# Patient Record
Sex: FEMALE | Race: WHITE | HISPANIC OR LATINO | Employment: UNEMPLOYED | URBAN - METROPOLITAN AREA
[De-identification: names, ages, dates, MRNs, and addresses within clinical notes are randomized per-mention and may not be internally consistent; named-entity substitution may affect disease eponyms.]

---

## 2018-01-31 ENCOUNTER — TELEPHONE (OUTPATIENT)
Dept: MEDICAL GROUP | Age: 48
End: 2018-01-31

## 2018-01-31 RX ORDER — LISINOPRIL 20 MG/1
20 TABLET ORAL DAILY
COMMUNITY
End: 2018-02-22 | Stop reason: SDUPTHER

## 2018-01-31 NOTE — TELEPHONE ENCOUNTER
NEW PATIENT VISIT PRE-VISIT PLANNING    1.  [employer] Patient is checked in Patient Demographics? YES    2.  Immunizations were updated in Epic using WebIZ?: No WebIZ record       •  Web Iz Recommendations:     3.  Is this appointment scheduled as a Hospital Follow-Up? No    4.  Patient is due for the following Health Maintenance Topics:   Health Maintenance Due   Topic Date Due   • IMM DTaP/Tdap/Td Vaccine (1 - Tdap) 11/16/1991   • PAP SMEAR  11/16/1993   • MAMMOGRAM  11/16/2012   • IMM INFLUENZA (1) 09/01/2017       Pnatient can not remember PCP last name    5.  Reviewed/Updated the following with patient:   •   Preferred Pharmacy? YES       •   Preferred Lab? YES       •   Preferred Communication? YES       •   Allergies? YES       •   Medications? YES. Was Abstract Encounter opened and chart updated? YES       •   Social History? YES. Was Abstract Encounter opened and chart updated? YES       •   Family History (document living status of immediate family members and if + hx of cancer, diabetes, hypertension, hyperlipidemia, heart attack, stroke) YES. Was Abstract Encounter opened and chart updated? YES    6.  Updated Care Team?       •   DME Company (gait device, O2, CPAP, etc.) YES       •   Other Specialists (eye doctor, derm, GYN, cardiology, endo, etc): YES    7.  Patient was informed to arrive 15 min prior to their scheduled appointment and bring in their medication bottles.

## 2018-02-22 ENCOUNTER — OFFICE VISIT (OUTPATIENT)
Dept: MEDICAL GROUP | Age: 48
End: 2018-02-22

## 2018-02-22 VITALS
OXYGEN SATURATION: 100 % | TEMPERATURE: 98 F | SYSTOLIC BLOOD PRESSURE: 130 MMHG | BODY MASS INDEX: 33.45 KG/M2 | HEART RATE: 107 BPM | HEIGHT: 62 IN | WEIGHT: 181.8 LBS | DIASTOLIC BLOOD PRESSURE: 80 MMHG

## 2018-02-22 DIAGNOSIS — G89.29 CHRONIC RIGHT SHOULDER PAIN: ICD-10-CM

## 2018-02-22 DIAGNOSIS — M54.12 CERVICAL RADICULOPATHY: ICD-10-CM

## 2018-02-22 DIAGNOSIS — M25.511 CHRONIC RIGHT SHOULDER PAIN: ICD-10-CM

## 2018-02-22 DIAGNOSIS — E66.9 OBESITY (BMI 30.0-34.9): ICD-10-CM

## 2018-02-22 DIAGNOSIS — E78.00 HYPERCHOLESTEROLEMIA: ICD-10-CM

## 2018-02-22 DIAGNOSIS — M89.8X1 PAIN OF RIGHT SCAPULA: ICD-10-CM

## 2018-02-22 DIAGNOSIS — I10 ESSENTIAL HYPERTENSION: ICD-10-CM

## 2018-02-22 PROCEDURE — 99204 OFFICE O/P NEW MOD 45 MIN: CPT | Performed by: INTERNAL MEDICINE

## 2018-02-22 RX ORDER — GABAPENTIN 300 MG/1
300 CAPSULE ORAL 3 TIMES DAILY
Qty: 90 CAP | Refills: 3 | Status: SHIPPED | OUTPATIENT
Start: 2018-02-22 | End: 2019-08-05

## 2018-02-22 RX ORDER — NAPROXEN 500 MG/1
500 TABLET ORAL 2 TIMES DAILY WITH MEALS
Qty: 60 TAB | Refills: 3 | Status: SHIPPED | OUTPATIENT
Start: 2018-02-22 | End: 2019-08-05

## 2018-02-22 RX ORDER — LISINOPRIL 20 MG/1
20 TABLET ORAL DAILY
Qty: 90 TAB | Refills: 3 | Status: SHIPPED | OUTPATIENT
Start: 2018-02-22 | End: 2018-06-11 | Stop reason: SDUPTHER

## 2018-02-22 ASSESSMENT — PAIN SCALES - GENERAL: PAINLEVEL: 5=MODERATE PAIN

## 2018-02-22 ASSESSMENT — PATIENT HEALTH QUESTIONNAIRE - PHQ9: CLINICAL INTERPRETATION OF PHQ2 SCORE: 0

## 2018-02-22 NOTE — ASSESSMENT & PLAN NOTE
Patient stated that she has pain started from neck and radiated to right shoulder, right arm, forearm, hand and fingers. She reported that she has numbness and tingling on her right hand and fingers all the time. She reported symptoms get worse and she has weakness on right arm and hand affecting hand writing now. She was treated with ibuprofen 400 mg for 4 months, tried baclofen and did not like side effects of dizziness. She had physical therapy for 4 months. She had shoulder x ray in [facility] last year and did not know the report details. Her prior PCP recommend her to do MRI.

## 2018-02-23 NOTE — ASSESSMENT & PLAN NOTE
Patient reported that she had pain on the right shoulder and right scapular over a year. She was evaluated in [facility] and was treated with cortisone injection into the shoulder without improvement. . She was treated with physical therapy and Ibuprofen for 4 months in the past. She stated that she has a straight on her shoulder, but she did not no details about the result. She was told by doctor that she has something wrong on her shoulder and needs to have MRI. Patient is now able to elevate the right shoulder above 180°. She also had a shortage of the length on the right upper extremity compared to left upper extremity. She reported that symptom gradually worse. She does not have any trauma or injury to the shoulder. She also reported having swelling on right hand and sometimes the right hand changed to purple color.

## 2018-02-23 NOTE — ASSESSMENT & PLAN NOTE
Patient reported pain on the right shoulder blade. Pain is deep, dull, aching in nature and sometimes stabbing in nature. She has weakness on right upper extremity and not able to raise her arm above the head. She was tried ibuprofen, physical therapy, cortisone injection in the past without improvement.

## 2018-02-23 NOTE — ASSESSMENT & PLAN NOTE
Patient stated that she has borderline high cholesterol. She has never been treated with medication.

## 2018-02-23 NOTE — ASSESSMENT & PLAN NOTE
Patient is taking lisinopril 20 mg daily. Her blood pressure is stable with current regimens. She denies side effects from taking lisinopril.

## 2018-02-23 NOTE — PROGRESS NOTES
Larisa Cunningham is a 47 y.o. female here to establish care and the evaluation and management of:      HPI:    Cervical radiculopathy  Patient stated that she has pain started from neck and radiated to right shoulder, right arm, forearm, hand and fingers. She reported that she has numbness and tingling on her right hand and fingers all the time. She reported symptoms get worse and she has weakness on right arm and hand affecting hand writing now. She was treated with ibuprofen 400 mg for 4 months, tried baclofen and did not like side effects of dizziness. She had physical therapy for 4 months. She had shoulder x ray in [facility] last year and did not know the report details. Her prior PCP recommend her to do MRI.     Hypercholesterolemia  Patient stated that she has borderline high cholesterol. She has never been treated with medication.    Essential hypertension  Patient is taking lisinopril 20 mg daily. Her blood pressure is stable with current regimens. She denies side effects from taking lisinopril.    Chronic right shoulder pain  Patient reported that she had pain on the right shoulder and right scapular over a year. She was evaluated in [facility] clinic and was treated with cortisone injection into the shoulder without improvement. . She was treated with physical therapy and Ibuprofen for 4 months in the past. She stated that she has a straight on her shoulder, but she did not no details about the result. She was told by doctor that she has something wrong on her shoulder and needs to have MRI. Patient is now able to elevate the right shoulder above 180°. She also had a shortage of the length on the right upper extremity compared to left upper extremity. She reported that symptom gradually worse. She does not have any trauma or injury to the shoulder. She also reported having swelling on right hand and sometimes the right hand changed to purple color.    Pain of right scapula  Patient  reported pain on the right shoulder blade. Pain is deep, dull, aching in nature and sometimes stabbing in nature. She has weakness on right upper extremity and not able to raise her arm above the head. She was tried ibuprofen, physical therapy, cortisone injection in the past without improvement.    Current medicines (including changes today)  Current Outpatient Prescriptions   Medication Sig Dispense Refill   • lisinopril (PRINIVIL) 20 MG Tab Take 1 Tab by mouth every day. 90 Tab 3   • gabapentin (NEURONTIN) 300 MG Cap Take 1 Cap by mouth 3 times a day. Start with 1 cap at night for 7 days and can increase the dose slowly to bid and tid 90 Cap 3   • naproxen (NAPROSYN) 500 MG Tab Take 1 Tab by mouth 2 times a day, with meals. 60 Tab 3     No current facility-administered medications for this visit.      She  has a past medical history of Hyperlipidemia and Hypertension.  She  has a past surgical history that includes appendectomy.  Social History   Substance Use Topics   • Smoking status: Current Some Day Smoker     Packs/day: 0.25   • Smokeless tobacco: Never Used   • Alcohol use Yes      Comment: occ     Social History     Social History Narrative   • No narrative on file     Family History   Problem Relation Age of Onset   • Diabetes Mother    • Diabetes Father    • No Known Problems Sister    • No Known Problems Brother    • No Known Problems Sister    • No Known Problems Brother    • No Known Problems Brother      Family Status   Relation Status   • Mother Alive   • Father Alive   • Sister Alive   • Brother Alive   • Sister Alive   • Brother Alive   • Brother Alive     Health Maintenance Topics with due status: Overdue       Topic Date Due    IMM DTaP/Tdap/Td Vaccine 11/16/1991    IMM PNEUMOCOCCAL 19-64 (ADULT) MEDIUM RISK SERIES 11/16/1991    PAP SMEAR 11/16/1993    MAMMOGRAM 11/16/2012    IMM INFLUENZA 09/01/2017         ROS    Gen.: Denied weight change, appetite change, fatigue.  ENT: Denied sinus  "tenderness, nasal congestion, runny nose, or sore throat  CVS: Denied chest pain, palpitations, legs swelling.  Respiratory: Denied cough, shortness of breath, wheezing.  GI: Denied abdominal pain, constipation or diarrhea.  Endocrine: Denied temperature intolerance, increased frequency of urination, polyphagia or polydipsia.  Musculoskeletal: Denied back pain or joint pain.    All other systems reviewed and are negative     Objective:     Blood pressure 130/80, pulse (!) 107, temperature 36.7 °C (98 °F), height 1.575 m (5' 2\"), weight 82.5 kg (181 lb 12.8 oz), SpO2 100 %, not currently breastfeeding. Body mass index is 33.25 kg/m².  Physical Exam:    Constitutional: Well nourished and Well developed, Alert, no distress.  Skin: Warm, dry, good turgor, no rashes in visible areas.  Eye: Equal, round and reactive, conjunctiva clear, lids normal.  ENMT: Lips without lesions, good dentition, oropharynx clear.  Neck: Trachea midline, no masses, no thyromegaly. No cervical or supraclavicular lymphadenopathy.  Respiratory: Unlabored respiratory effort, lungs clear to auscultation, no wheezes, no ronchi.  Cardiovascular: Normal S1, S2, no murmur, no edema.   Abdomen: Soft, non distended, non-tender, no masses, no hepatosplenomegaly. Bowel sound normal.  Extremities: No edema, no clubbing, no cyanosis.  Psych: Alert and oriented x3, normal affect and mood.  Musculoskeletal exam: Tender on palpation of the right paraspinal of spine at C5-T1, tender on palpation of media border of the right scapula, tender on the right shoulder, patient is not able to elevate right arm above 180°. She has  Decreased sensation on right upper extremity and right hand compared to left, decreased hand  on right hand. Mild swelling on the right hand and right fingers.    Assessment and Plan:   The following treatment plan was discussed       1. Cervical radiculopathy  - Will try naproxen 500 mg twice a day for joint pain and neck pain. Advised " to take naproxen with meals and advised to stop taking it if she has abdominal pain or black tarry stool or bloody bowel movement.  - Advised to try gabapentin 300 mg daily at bedtime for 5 days to 7 days. She can increase gabapentin to twice a day to treat times a day if she tolerates medication without side effects.  - Will repeat cervical spine x-ray.  - Given her neurological symptoms of tingling, numbness, loss of sensation and weakness on the right upper extremity, we will do CT C-spine for further assessment.  - I will also refer patient to spine specialist for further evaluation.  - DX-CERVICAL SPINE-2 OR 3 VIEWS; Future  - CT-CSPINE WITHOUT PLUS RECONS; Future  - gabapentin (NEURONTIN) 300 MG Cap; Take 1 Cap by mouth 3 times a day. Start with 1 cap at night for 7 days and can increase the dose slowly to bid and tid  Dispense: 90 Cap; Refill: 3  - naproxen (NAPROSYN) 500 MG Tab; Take 1 Tab by mouth 2 times a day, with meals.  Dispense: 60 Tab; Refill: 3  - REFERRAL TO PAIN CLINIC    2. Essential hypertension  - Well-controlled. Continue current regimens. Recheck lab 1-2 weeks before next follow up visit. Refill medication today.  - Recommend to monitor blood pressure and heart rate at home.  - lisinopril (PRINIVIL) 20 MG Tab; Take 1 Tab by mouth every day.  Dispense: 90 Tab; Refill: 3  - CBC WITH DIFFERENTIAL; Future  - COMP METABOLIC PANEL; Future    3. Hypercholesterolemia  - Not on medication. Continue to control with diet and exercise.  - COMP METABOLIC PANEL; Future  - LIPID PROFILE; Future    4. Chronic right shoulder pain  - Will try naproxen. See above discussion. Discussed to continue stretching exercise taught by physical therapist in the past. Will refer to orthopedist for further assessment since right shoulder pain is over a year without improvement with conservative treatment and physical therapy. She also has limitation of range of movement of the right shoulder and weakness on the right  arm.  - Will order right shoulder x-ray for further assessment.  - DX-SHOULDER 2+ RIGHT; Future  - naproxen (NAPROSYN) 500 MG Tab; Take 1 Tab by mouth 2 times a day, with meals.  Dispense: 60 Tab; Refill: 3  - REFERRAL TO ORTHOPEDICS    5. Pain of right scapula  - It can be from radiating pain from cervical spine versus shoulder or underlying scapular problem.  - Will have scapula x-ray for assessment. We will try naproxen twice a day with meals. Discussed to try heating cramp or heating pad.  - DX-SCAPULA RIGHT; Future  - naproxen (NAPROSYN) 500 MG Tab; Take 1 Tab by mouth 2 times a day, with meals.  Dispense: 60 Tab; Refill: 3  - REFERRAL TO ORTHOPEDICS    6. Obesity (BMI 30.0-34.9)  - Counseled for healthy diet and regular physical exercise to lose weight.   - Patient identified as having weight management issue.  Appropriate orders and counseling given.        Records requested.  Followup: Return in about 4 weeks (around 3/22/2018), or if symptoms worsen or fail to improve, for hypertension, hypercholesterolemia, cervical radiculopathy, right shoulder pain, right scapular pain. sooner should new symptoms or problems arise.      Please note that this dictation was created using voice recognition software. I have made every reasonable attempt to correct obvious errors, but I expect that there may have unintended errors in text, spelling, punctuation, or grammar that I did not discover.

## 2018-03-20 ENCOUNTER — HOSPITAL ENCOUNTER (OUTPATIENT)
Dept: RADIOLOGY | Facility: MEDICAL CENTER | Age: 48
End: 2018-03-20
Attending: INTERNAL MEDICINE

## 2018-03-20 DIAGNOSIS — G89.29 CHRONIC RIGHT SHOULDER PAIN: ICD-10-CM

## 2018-03-20 DIAGNOSIS — M25.511 CHRONIC RIGHT SHOULDER PAIN: ICD-10-CM

## 2018-03-20 DIAGNOSIS — M54.12 CERVICAL RADICULOPATHY: ICD-10-CM

## 2018-03-20 DIAGNOSIS — M89.8X1 PAIN OF RIGHT SCAPULA: ICD-10-CM

## 2018-03-20 PROCEDURE — 72125 CT NECK SPINE W/O DYE: CPT

## 2018-03-20 PROCEDURE — 73030 X-RAY EXAM OF SHOULDER: CPT | Mod: RT

## 2018-03-20 PROCEDURE — 73010 X-RAY EXAM OF SHOULDER BLADE: CPT | Mod: RT

## 2018-03-20 PROCEDURE — 72040 X-RAY EXAM NECK SPINE 2-3 VW: CPT

## 2018-03-21 NOTE — PROGRESS NOTES
Please let patient know that right scapular x-ray showed that she has arthritis changes on the right acromioclavicular joint, but no fracture. I will discuss the result in upcoming appointment.     Kateryna Mcconnell MD

## 2018-03-21 NOTE — PROGRESS NOTES
Please inform patient that her recent CT neck spine showed that she has mild multiple level of degenerative disc disease. I will discuss the result in upcoming appointment.     Kateryna Mcconnell MD

## 2018-03-21 NOTE — PROGRESS NOTES
Please let patient know that recent neck x-ray showed mild degenerative disc disease and scoliosis but no fracture. I will discuss the result in upcoming appointment.     Kateryna Mcconnell MD

## 2018-03-22 ENCOUNTER — APPOINTMENT (OUTPATIENT)
Dept: MEDICAL GROUP | Age: 48
End: 2018-03-22

## 2018-03-26 ENCOUNTER — OFFICE VISIT (OUTPATIENT)
Dept: MEDICAL GROUP | Age: 48
End: 2018-03-26

## 2018-03-26 VITALS
OXYGEN SATURATION: 97 % | BODY MASS INDEX: 34.25 KG/M2 | SYSTOLIC BLOOD PRESSURE: 120 MMHG | HEART RATE: 99 BPM | HEIGHT: 61 IN | TEMPERATURE: 98 F | WEIGHT: 181.4 LBS | DIASTOLIC BLOOD PRESSURE: 70 MMHG

## 2018-03-26 DIAGNOSIS — M54.12 CERVICAL RADICULOPATHY: ICD-10-CM

## 2018-03-26 DIAGNOSIS — E78.00 HYPERCHOLESTEROLEMIA: ICD-10-CM

## 2018-03-26 DIAGNOSIS — Z12.31 VISIT FOR SCREENING MAMMOGRAM: ICD-10-CM

## 2018-03-26 DIAGNOSIS — M19.90 ARTHRITIS: ICD-10-CM

## 2018-03-26 DIAGNOSIS — M79.641 RIGHT HAND PAIN: ICD-10-CM

## 2018-03-26 DIAGNOSIS — I10 ESSENTIAL HYPERTENSION: ICD-10-CM

## 2018-03-26 PROCEDURE — 99214 OFFICE O/P EST MOD 30 MIN: CPT | Performed by: INTERNAL MEDICINE

## 2018-03-26 NOTE — ASSESSMENT & PLAN NOTE
Her blood pressure improved after restarting lisinopril 20 mg daily. She denies side effects from taking lisinopril. Patient has standing blood test for CMP to follow up on kidney function and electrolyte.

## 2018-03-26 NOTE — ASSESSMENT & PLAN NOTE
Patient states that the tingling numbness on right upper extremity and pain on the neck improves with naproxen and gabapentin. She took gabapentin 300 mg daily at bedtime and she tolerated gabapentin if she only took once a day at night. She felt dizziness when she takes gabapentin twice a day. We discussed to take gabapentin only at nighttime. CT C-spine showed mild multilevel of degenerative disc disease and C7-T1 facet joint arthrosis. She has follow-up appointment with spine specialist on 4/18/18.

## 2018-03-26 NOTE — PROGRESS NOTES
Subjective:   Larisa Pinzon is a 47 y.o. female here today for evaluation and management of:      Right hand pain  Patient reported that she has worsening pain on the right hand, mostly on second, third, fourth proximal interphalangeal joints for a few weeks. She stated that she has chronic hand pain for 7 months, but the joint pain worsening on few weeks. She was seen by [facility] orthopedist and orthopedist recommended to refer to hand surgeon. She is not able to grab or hold tight due to the pain. She is taking naproxen 500 mg twice a day when necessary. Pain improved with naproxen.    Hypercholesterolemia  Patient has not had blood tests yet. She will try to control her cholesterol with diet and exercise currently.    Essential hypertension  Her blood pressure improved after restarting lisinopril 20 mg daily. She denies side effects from taking lisinopril. Patient has standing blood test for CMP to follow up on kidney function and electrolyte.    Cervical radiculopathy  Patient states that the tingling numbness on right upper extremity and pain on the neck improves with naproxen and gabapentin. She took gabapentin 300 mg daily at bedtime and she tolerated gabapentin if she only took once a day at night. She felt dizziness when she takes gabapentin twice a day. We discussed to take gabapentin only at nighttime. CT C-spine showed mild multilevel of degenerative disc disease and C7-T1 facet joint arthrosis. She has follow-up appointment with spine specialist on 4/18/18.         Current medicines (including changes today)  Current Outpatient Prescriptions   Medication Sig Dispense Refill   • lisinopril (PRINIVIL) 20 MG Tab Take 1 Tab by mouth every day. 90 Tab 3   • gabapentin (NEURONTIN) 300 MG Cap Take 1 Cap by mouth 3 times a day. Start with 1 cap at night for 7 days and can increase the dose slowly to bid and tid 90 Cap 3   • naproxen (NAPROSYN) 500 MG Tab Take 1 Tab by mouth 2 times a  "day, with meals. 60 Tab 3     No current facility-administered medications for this visit.      She  has a past medical history of Hyperlipidemia and Hypertension.    ROS   No chest pain, no shortness of breath, no abdominal pain       Objective:     Blood pressure 120/70, pulse 99, temperature 36.7 °C (98 °F), height 1.537 m (5' 0.5\"), weight 82.3 kg (181 lb 6.4 oz), SpO2 97 %. Body mass index is 34.84 kg/m².   Physical Exam:  General: Alert, oriented and no acute distress.  Eye contact is good, speech goal directed, affect calm  HEENT: conjunctiva non-injected, sclera non-icteric.  Oral mucous membranes pink and moist with no lesions.  Pinna normal.   Lungs: Normal respiratory effort, clear to auscultation bilaterally with good excursion.  CV: regular rate and rhythm. No murmurs.   Abdomen: soft, non distended, nontender, Bowel sound normal.  Ext: no edema, color normal, vascularity normal, temperature normal        Assessment and Plan:   The following treatment plan was discussed     1. Hypercholesterolemia  - Not on medication yet. Continue to control with diet and exercise.  - Advised to eat low fat, low carbohydrate and high fiber diet as well as do cardio physical exercise regularly.     2. Essential hypertension  - Well controlled. Continue lisinopril 20 mg daily. Reviewed potential side effects of lisinopril with patient.  - Recommend to monitor blood pressure and heart rate at home.    3. Cervical radiculopathy  - Chronic, improved with naproxen and gabapentin. Patient reported feeling dizzy if she takes gabapentin twice a day.  - Recommend to cut down the dose of gabapentin to once a day only. I advised patient to take gabapentin 300 mg only at night. Reviewed potential side effects of gabapentin with patient.  - Patient has follow-up appointment with spine specialist on 4/18/18. Advised to do stretching exercise on the neck and shoulders.    4. Right hand pain  - Refer to hand surgeon as her right hand " joint pain, increased and has swelling on second, third and fourth right PIP joint.  - REFERRAL TO HAND SURGERY    5. Arthritis  - Patient has pain on both shoulders. Pain on knees and right hand pain. She also reported weakness and morning stiffness. Order blood tests for further workup for rheumatology disease.  - RHEUMATOID ARTHRITIS FACTOR; Future  - CCP ANTIBODY; Future  - WESTERGREN SED RATE; Future  - CRP QUANTITIVE (NON-CARDIAC); Future  - URIC ACID; Future  - VLAD ANTIBODY WITH REFLEX; Future    6. Visit for screening mammogram  - Counseling for breast cancer screening. Patient stated that she has mammogram more than one year ago. She agreed to have screening mammogram. Order screening mammogram today.  - MA-SCREEN MAMMO W/CAD-BILAT; Future        Followup: Return in about 6 months (around 9/26/2018), or if symptoms worsen or fail to improve, for hypertension, hypercholesterolemia, right hand pain, neck pain, lab review.      Please note that this dictation was created using voice recognition software. I have made every reasonable attempt to correct obvious errors, but I expect that there may have unintended errors in text, spelling, punctuation, or grammar that I did not discover.

## 2018-03-26 NOTE — ASSESSMENT & PLAN NOTE
Patient has not had blood tests yet. She will try to control her cholesterol with diet and exercise currently.

## 2018-03-26 NOTE — ASSESSMENT & PLAN NOTE
Patient reported that she has worsening pain on the right hand, mostly on second, third, fourth proximal interphalangeal joints for a few weeks. She stated that she has chronic hand pain for 7 months, but the joint pain worsening on few weeks. She was seen by [facility] orthopedist and orthopedist recommended to refer to hand surgeon. She is not able to grab or hold tight due to the pain. She is taking naproxen 500 mg twice a day when necessary. Pain improved with naproxen.

## 2018-06-11 DIAGNOSIS — I10 ESSENTIAL HYPERTENSION: ICD-10-CM

## 2018-06-11 RX ORDER — LISINOPRIL 20 MG/1
20 TABLET ORAL DAILY
Qty: 90 TAB | Refills: 3 | Status: SHIPPED | OUTPATIENT
Start: 2018-06-11 | End: 2019-05-28 | Stop reason: SDUPTHER

## 2019-05-20 ENCOUNTER — TELEPHONE (OUTPATIENT)
Dept: MEDICAL GROUP | Age: 49
End: 2019-05-20

## 2019-05-20 NOTE — LETTER
[facility]  Kateryna Mcocnnell M.D.  [address]  [place] [address]  Fax: [phone]   Authorization for Release/Disclosure of   Protected Health Information   Name: OTIS SAEZ : 1970 SSN: [id number]   Address: [address] [place] [address]  [place] [address] Phone:    [phone] (home)    I authorize the entity listed below to release/disclose the PHI below to:   [facility]/Kateryna Mcconnell M.D. and Kateryna Mcconnell M.D.   Provider or Entity Name:  [facility]    Address   [facility], [facility]   Phone:    Fax:  [phone]   Reason for request: continuity of care   Information to be released:    [  ] LAST COLONOSCOPY,  including any PATH REPORT and follow-up  [  ] LAST FIT/COLOGUARD RESULT [  ] LAST DEXA  [  ] LAST MAMMOGRAM  [  ] LAST PAP  [  ] LAST LABS [  ] RETINA EXAM REPORT  [  ] IMMUNIZATION RECORDS  [ X ] Release all info      [  ] Check here and initial the line next to each item to release ALL health information INCLUDING  _____ Care and treatment for drug and / or alcohol abuse  _____ HIV testing, infection status, or AIDS  _____ Genetic Testing    DATES OF SERVICE OR TIME PERIOD TO BE DISCLOSED: _____________  I understand and acknowledge that:  * This Authorization may be revoked at any time by you in writing, except if your health information has already been used or disclosed.  * Your health information that will be used or disclosed as a result of you signing this authorization could be re-disclosed by the recipient. If this occurs, your re-disclosed health information may no longer be protected by State or Federal laws.  * You may refuse to sign this Authorization. Your refusal will not affect your ability to obtain treatment.  * This Authorization becomes effective upon signing and will  on (date) __________.      If no date is indicated, this Authorization will  one (1) year from the signature date.    Name: Otis [place]  Malka Pinzon    Signature: Continuity of care  Date:     5/20/2019       PLEASE FAX REQUESTED RECORDS BACK TO: [phone]

## 2019-05-20 NOTE — TELEPHONE ENCOUNTER
Received a fax stating Pt was seen on 5/16/19 as an emergency patient.    BERTA sent to [phone]

## 2019-05-20 NOTE — TELEPHONE ENCOUNTER
Phone Number Called: [phone] (home)       Call outcome: left message for patient to call back regarding message below    Message: Left VM for Pt to schedule appointment with PCP

## 2019-05-28 DIAGNOSIS — I10 ESSENTIAL HYPERTENSION: ICD-10-CM

## 2019-05-28 DIAGNOSIS — E78.00 HYPERCHOLESTEROLEMIA: ICD-10-CM

## 2019-05-28 RX ORDER — LISINOPRIL 20 MG/1
TABLET ORAL
Qty: 90 TAB | Refills: 0 | Status: SHIPPED | OUTPATIENT
Start: 2019-05-28 | End: 2019-08-05

## 2019-05-28 NOTE — TELEPHONE ENCOUNTER
Please let patient know that she is due for blood test and follow up in clinic. I refilled lisinopril for 90 day supply today.  This will be the last refill.  She needs to do fasting blood tests and follow up clinic for further management of medications.    Blood tests were ordered on 5/28/2019.  Please advise patient to fast 12 hours before blood tests. Please advise to do blood test before follow up visit. Please schedule follow up appointment for patient.    Thanks!  Kateryna Mcconnell M.D.

## 2019-05-30 NOTE — TELEPHONE ENCOUNTER
Phone Number Called: [phone] (home)       Call outcome: Unable to lvm    Message: Attempted call to pt twice. Both times received busy signal. Unable to lvm. Messaged pt through [facility] and mailed letter. Advised per Dr. Mcconnell's notes (please reference Dr. Mcconnell's notes from 05/28/19).

## 2019-06-03 ENCOUNTER — TELEPHONE (OUTPATIENT)
Dept: MEDICAL GROUP | Age: 49
End: 2019-06-03

## 2019-06-03 NOTE — LETTER
[email or web address]  Kateryna Mcconnell M.D.  [address]  [place] [address]  Fax: [phone]   Authorization for Release/Disclosure of   Protected Health Information   Name: OTIS SAEZ : 1970 SSN: [id number]   Address: [address] [place] [address]  [place] [address] Phone:    [phone] (home)    I authorize the entity listed below to release/disclose the PHI below to:   [email or web address]/Kateryna Mcconnell M.D. and Kateryna Mcconnell M.D.   Provider or Entity Name:  [facility]    Address   [facility], [facility], [facility]   Phone:    Fax:  [phone]   Reason for request: continuity of care   Information to be released:    [  ] LAST COLONOSCOPY,  including any PATH REPORT and follow-up  [  ] LAST FIT/COLOGUARD RESULT [  ] LAST DEXA  [  ] LAST MAMMOGRAM  [  ] LAST PAP  [  ] LAST LABS [  ] RETINA EXAM REPORT  [  ] IMMUNIZATION RECORDS  [ X ] Release all info      [  ] Check here and initial the line next to each item to release ALL health information INCLUDING  _____ Care and treatment for drug and / or alcohol abuse  _____ HIV testing, infection status, or AIDS  _____ Genetic Testing    DATES OF SERVICE OR TIME PERIOD TO BE DISCLOSED: _____________  I understand and acknowledge that:  * This Authorization may be revoked at any time by you in writing, except if your health information has already been used or disclosed.  * Your health information that will be used or disclosed as a result of you signing this authorization could be re-disclosed by the recipient. If this occurs, your re-disclosed health information may no longer be protected by State or Federal laws.  * You may refuse to sign this Authorization. Your refusal will not affect your ability to obtain treatment.  * This Authorization becomes effective upon signing and will  on (date) __________.      If no date is indicated, this Authorization will  one (1) year from the signature date.    Name: Otis Holman  Malka Pinzon    Signature:   Date:     6/3/2019       PLEASE FAX REQUESTED RECORDS BACK TO: [phone]

## 2019-06-03 NOTE — TELEPHONE ENCOUNTER
Received a fax from [facility] stating Pt was seen as an Emergency Patient on 5/30/19    BERTA faxed to [phone]

## 2019-08-05 ENCOUNTER — ANESTHESIA (OUTPATIENT)
Dept: SURGERY | Facility: MEDICAL CENTER | Age: 49
End: 2019-08-05

## 2019-08-05 ENCOUNTER — HOSPITAL ENCOUNTER (OUTPATIENT)
Facility: MEDICAL CENTER | Age: 49
End: 2019-08-05
Attending: EMERGENCY MEDICINE | Admitting: HOSPITALIST

## 2019-08-05 ENCOUNTER — APPOINTMENT (OUTPATIENT)
Dept: RADIOLOGY | Facility: MEDICAL CENTER | Age: 49
End: 2019-08-05
Attending: EMERGENCY MEDICINE

## 2019-08-05 ENCOUNTER — ANESTHESIA EVENT (OUTPATIENT)
Dept: SURGERY | Facility: MEDICAL CENTER | Age: 49
End: 2019-08-05

## 2019-08-05 VITALS
TEMPERATURE: 98 F | WEIGHT: 170.42 LBS | RESPIRATION RATE: 18 BRPM | BODY MASS INDEX: 31.36 KG/M2 | SYSTOLIC BLOOD PRESSURE: 113 MMHG | HEIGHT: 62 IN | OXYGEN SATURATION: 98 % | HEART RATE: 60 BPM | DIASTOLIC BLOOD PRESSURE: 60 MMHG

## 2019-08-05 DIAGNOSIS — D64.9 ANEMIA, UNSPECIFIED TYPE: ICD-10-CM

## 2019-08-05 DIAGNOSIS — K81.0 ACUTE CHOLECYSTITIS: ICD-10-CM

## 2019-08-05 PROBLEM — D72.829 LEUKOCYTOSIS: Status: ACTIVE | Noted: 2019-08-05

## 2019-08-05 PROBLEM — D75.839 THROMBOCYTOSIS: Status: ACTIVE | Noted: 2019-08-05

## 2019-08-05 PROBLEM — D50.9 MICROCYTIC ANEMIA: Status: ACTIVE | Noted: 2019-08-05

## 2019-08-05 LAB
ALBUMIN SERPL BCP-MCNC: 4.5 G/DL (ref 3.2–4.9)
ALBUMIN/GLOB SERPL: 1.7 G/DL
ALP SERPL-CCNC: 66 U/L (ref 30–99)
ALT SERPL-CCNC: 14 U/L (ref 2–50)
ANION GAP SERPL CALC-SCNC: 9 MMOL/L (ref 0–11.9)
ANISOCYTOSIS BLD QL SMEAR: ABNORMAL
AST SERPL-CCNC: 18 U/L (ref 12–45)
BASOPHILS # BLD AUTO: 0.8 % (ref 0–1.8)
BASOPHILS # BLD: 0.09 K/UL (ref 0–0.12)
BILIRUB SERPL-MCNC: 0.4 MG/DL (ref 0.1–1.5)
BUN SERPL-MCNC: 9 MG/DL (ref 8–22)
CALCIUM SERPL-MCNC: 9.8 MG/DL (ref 8.5–10.5)
CHLORIDE SERPL-SCNC: 107 MMOL/L (ref 96–112)
CHOLEST SERPL-MCNC: 138 MG/DL (ref 100–199)
CO2 SERPL-SCNC: 24 MMOL/L (ref 20–33)
COMMENT 1642: NORMAL
CREAT SERPL-MCNC: 0.55 MG/DL (ref 0.5–1.4)
EOSINOPHIL # BLD AUTO: 0.25 K/UL (ref 0–0.51)
EOSINOPHIL NFR BLD: 2.3 % (ref 0–6.9)
ERYTHROCYTE [DISTWIDTH] IN BLOOD BY AUTOMATED COUNT: 45 FL (ref 35.9–50)
FERRITIN SERPL-MCNC: 2.7 NG/ML (ref 10–291)
FOLATE SERPL-MCNC: 9.4 NG/ML
GLOBULIN SER CALC-MCNC: 2.6 G/DL (ref 1.9–3.5)
GLUCOSE SERPL-MCNC: 101 MG/DL (ref 65–99)
HCG UR QL: NEGATIVE
HCT VFR BLD AUTO: 30 % (ref 37–47)
HDLC SERPL-MCNC: 32 MG/DL
HGB BLD-MCNC: 7.9 G/DL (ref 12–16)
HYPOCHROMIA BLD QL SMEAR: ABNORMAL
IMM GRANULOCYTES # BLD AUTO: 0.12 K/UL (ref 0–0.11)
IMM GRANULOCYTES NFR BLD AUTO: 1.1 % (ref 0–0.9)
IRON SATN MFR SERPL: 3 % (ref 15–55)
IRON SERPL-MCNC: 16 UG/DL (ref 40–170)
LDLC SERPL CALC-MCNC: 81 MG/DL
LIPASE SERPL-CCNC: 24 U/L (ref 11–82)
LYMPHOCYTES # BLD AUTO: 1.84 K/UL (ref 1–4.8)
LYMPHOCYTES NFR BLD: 16.7 % (ref 22–41)
MCH RBC QN AUTO: 17.4 PG (ref 27–33)
MCHC RBC AUTO-ENTMCNC: 26.3 G/DL (ref 33.6–35)
MCV RBC AUTO: 65.9 FL (ref 81.4–97.8)
MICROCYTES BLD QL SMEAR: ABNORMAL
MONOCYTES # BLD AUTO: 0.74 K/UL (ref 0–0.85)
MONOCYTES NFR BLD AUTO: 6.7 % (ref 0–13.4)
MORPHOLOGY BLD-IMP: NORMAL
NEUTROPHILS # BLD AUTO: 7.98 K/UL (ref 2–7.15)
NEUTROPHILS NFR BLD: 72.4 % (ref 44–72)
NRBC # BLD AUTO: 0.02 K/UL
NRBC BLD-RTO: 0.2 /100 WBC
OVALOCYTES BLD QL SMEAR: NORMAL
PATHOLOGY CONSULT NOTE: NORMAL
PLATELET # BLD AUTO: 712 K/UL (ref 164–446)
PLATELET BLD QL SMEAR: NORMAL
PMV BLD AUTO: 9.1 FL (ref 9–12.9)
POIKILOCYTOSIS BLD QL SMEAR: NORMAL
POLYCHROMASIA BLD QL SMEAR: NORMAL
POTASSIUM SERPL-SCNC: 3.9 MMOL/L (ref 3.6–5.5)
PROT SERPL-MCNC: 7.1 G/DL (ref 6–8.2)
RBC # BLD AUTO: 4.55 M/UL (ref 4.2–5.4)
RBC BLD AUTO: PRESENT
SODIUM SERPL-SCNC: 140 MMOL/L (ref 135–145)
TIBC SERPL-MCNC: 511 UG/DL (ref 250–450)
TRIGL SERPL-MCNC: 125 MG/DL (ref 0–149)
TSH SERPL DL<=0.005 MIU/L-ACNC: 2.48 UIU/ML (ref 0.38–5.33)
VIT B12 SERPL-MCNC: 643 PG/ML (ref 211–911)
WBC # BLD AUTO: 11 K/UL (ref 4.8–10.8)

## 2019-08-05 PROCEDURE — 700111 HCHG RX REV CODE 636 W/ 250 OVERRIDE (IP): Performed by: HOSPITALIST

## 2019-08-05 PROCEDURE — 80061 LIPID PANEL: CPT

## 2019-08-05 PROCEDURE — 96374 THER/PROPH/DIAG INJ IV PUSH: CPT

## 2019-08-05 PROCEDURE — 83021 HEMOGLOBIN CHROMOTOGRAPHY: CPT

## 2019-08-05 PROCEDURE — 500002 HCHG ADHESIVE, DERMABOND: Performed by: SURGERY

## 2019-08-05 PROCEDURE — 700105 HCHG RX REV CODE 258: Performed by: ANESTHESIOLOGY

## 2019-08-05 PROCEDURE — 81025 URINE PREGNANCY TEST: CPT

## 2019-08-05 PROCEDURE — 96375 TX/PRO/DX INJ NEW DRUG ADDON: CPT

## 2019-08-05 PROCEDURE — 502571 HCHG PACK, LAP CHOLE: Performed by: SURGERY

## 2019-08-05 PROCEDURE — 501571 HCHG TROCAR, SEPARATOR 12X100: Performed by: SURGERY

## 2019-08-05 PROCEDURE — 85025 COMPLETE CBC W/AUTO DIFF WBC: CPT

## 2019-08-05 PROCEDURE — 160002 HCHG RECOVERY MINUTES (STAT): Performed by: SURGERY

## 2019-08-05 PROCEDURE — 700101 HCHG RX REV CODE 250: Performed by: ANESTHESIOLOGY

## 2019-08-05 PROCEDURE — 83540 ASSAY OF IRON: CPT

## 2019-08-05 PROCEDURE — 99291 CRITICAL CARE FIRST HOUR: CPT

## 2019-08-05 PROCEDURE — 82746 ASSAY OF FOLIC ACID SERUM: CPT

## 2019-08-05 PROCEDURE — 84443 ASSAY THYROID STIM HORMONE: CPT

## 2019-08-05 PROCEDURE — 501570 HCHG TROCAR, SEPARATOR: Performed by: SURGERY

## 2019-08-05 PROCEDURE — 99220 PR INITIAL OBSERVATION CARE,LEVL III: CPT | Performed by: HOSPITALIST

## 2019-08-05 PROCEDURE — 501583 HCHG TROCAR, THRD CAN&SEAL 5X100: Performed by: SURGERY

## 2019-08-05 PROCEDURE — 160035 HCHG PACU - 1ST 60 MINS PHASE I: Performed by: SURGERY

## 2019-08-05 PROCEDURE — 83690 ASSAY OF LIPASE: CPT

## 2019-08-05 PROCEDURE — 80053 COMPREHEN METABOLIC PANEL: CPT

## 2019-08-05 PROCEDURE — 96365 THER/PROPH/DIAG IV INF INIT: CPT

## 2019-08-05 PROCEDURE — 500868 HCHG NEEDLE, SURGI(VARES): Performed by: SURGERY

## 2019-08-05 PROCEDURE — 501399 HCHG SPECIMAN BAG, ENDO CATC: Performed by: SURGERY

## 2019-08-05 PROCEDURE — 160048 HCHG OR STATISTICAL LEVEL 1-5: Performed by: SURGERY

## 2019-08-05 PROCEDURE — 700101 HCHG RX REV CODE 250: Performed by: SURGERY

## 2019-08-05 PROCEDURE — 160028 HCHG SURGERY MINUTES - 1ST 30 MINS LEVEL 3: Performed by: SURGERY

## 2019-08-05 PROCEDURE — 83550 IRON BINDING TEST: CPT

## 2019-08-05 PROCEDURE — G0378 HOSPITAL OBSERVATION PER HR: HCPCS

## 2019-08-05 PROCEDURE — 700111 HCHG RX REV CODE 636 W/ 250 OVERRIDE (IP): Performed by: ANESTHESIOLOGY

## 2019-08-05 PROCEDURE — 160009 HCHG ANES TIME/MIN: Performed by: SURGERY

## 2019-08-05 PROCEDURE — 160036 HCHG PACU - EA ADDL 30 MINS PHASE I: Performed by: SURGERY

## 2019-08-05 PROCEDURE — 160039 HCHG SURGERY MINUTES - EA ADDL 1 MIN LEVEL 3: Performed by: SURGERY

## 2019-08-05 PROCEDURE — A9270 NON-COVERED ITEM OR SERVICE: HCPCS | Performed by: ANESTHESIOLOGY

## 2019-08-05 PROCEDURE — 501838 HCHG SUTURE GENERAL: Performed by: SURGERY

## 2019-08-05 PROCEDURE — 700102 HCHG RX REV CODE 250 W/ 637 OVERRIDE(OP): Performed by: HOSPITALIST

## 2019-08-05 PROCEDURE — 96366 THER/PROPH/DIAG IV INF ADDON: CPT

## 2019-08-05 PROCEDURE — 700105 HCHG RX REV CODE 258: Performed by: HOSPITALIST

## 2019-08-05 PROCEDURE — 82728 ASSAY OF FERRITIN: CPT

## 2019-08-05 PROCEDURE — 700102 HCHG RX REV CODE 250 W/ 637 OVERRIDE(OP): Performed by: ANESTHESIOLOGY

## 2019-08-05 PROCEDURE — A9270 NON-COVERED ITEM OR SERVICE: HCPCS | Performed by: HOSPITALIST

## 2019-08-05 PROCEDURE — 82607 VITAMIN B-12: CPT

## 2019-08-05 PROCEDURE — 76705 ECHO EXAM OF ABDOMEN: CPT

## 2019-08-05 PROCEDURE — 88304 TISSUE EXAM BY PATHOLOGIST: CPT

## 2019-08-05 PROCEDURE — 700111 HCHG RX REV CODE 636 W/ 250 OVERRIDE (IP): Performed by: EMERGENCY MEDICINE

## 2019-08-05 RX ORDER — BUPIVACAINE HYDROCHLORIDE AND EPINEPHRINE 5; 5 MG/ML; UG/ML
INJECTION, SOLUTION EPIDURAL; INTRACAUDAL; PERINEURAL
Status: DISCONTINUED | OUTPATIENT
Start: 2019-08-05 | End: 2019-08-05 | Stop reason: HOSPADM

## 2019-08-05 RX ORDER — DEXAMETHASONE SODIUM PHOSPHATE 4 MG/ML
INJECTION, SOLUTION INTRA-ARTICULAR; INTRALESIONAL; INTRAMUSCULAR; INTRAVENOUS; SOFT TISSUE PRN
Status: DISCONTINUED | OUTPATIENT
Start: 2019-08-05 | End: 2019-08-05 | Stop reason: SURG

## 2019-08-05 RX ORDER — MEPERIDINE HYDROCHLORIDE 25 MG/ML
6.25 INJECTION INTRAMUSCULAR; INTRAVENOUS; SUBCUTANEOUS
Status: DISCONTINUED | OUTPATIENT
Start: 2019-08-05 | End: 2019-08-05 | Stop reason: HOSPADM

## 2019-08-05 RX ORDER — OMEPRAZOLE 20 MG/1
40 CAPSULE, DELAYED RELEASE ORAL DAILY
Status: DISCONTINUED | OUTPATIENT
Start: 2019-08-05 | End: 2019-08-05 | Stop reason: HOSPADM

## 2019-08-05 RX ORDER — DIPHENHYDRAMINE HCL 25 MG
25 TABLET ORAL ONCE
Status: COMPLETED | OUTPATIENT
Start: 2019-08-05 | End: 2019-08-05

## 2019-08-05 RX ORDER — BISACODYL 10 MG
10 SUPPOSITORY, RECTAL RECTAL
Status: DISCONTINUED | OUTPATIENT
Start: 2019-08-05 | End: 2019-08-05 | Stop reason: HOSPADM

## 2019-08-05 RX ORDER — MIDAZOLAM HYDROCHLORIDE 1 MG/ML
INJECTION INTRAMUSCULAR; INTRAVENOUS PRN
Status: DISCONTINUED | OUTPATIENT
Start: 2019-08-05 | End: 2019-08-05 | Stop reason: SURG

## 2019-08-05 RX ORDER — SODIUM CHLORIDE, SODIUM LACTATE, POTASSIUM CHLORIDE, CALCIUM CHLORIDE 600; 310; 30; 20 MG/100ML; MG/100ML; MG/100ML; MG/100ML
INJECTION, SOLUTION INTRAVENOUS
Status: DISCONTINUED | OUTPATIENT
Start: 2019-08-05 | End: 2019-08-05 | Stop reason: SURG

## 2019-08-05 RX ORDER — OMEPRAZOLE 20 MG/1
40 CAPSULE, DELAYED RELEASE ORAL DAILY
COMMUNITY

## 2019-08-05 RX ORDER — ONDANSETRON 2 MG/ML
4 INJECTION INTRAMUSCULAR; INTRAVENOUS ONCE
Status: COMPLETED | OUTPATIENT
Start: 2019-08-05 | End: 2019-08-05

## 2019-08-05 RX ORDER — OXYCODONE HYDROCHLORIDE 5 MG/1
5 TABLET ORAL EVERY 4 HOURS PRN
Qty: 30 TAB | Refills: 0 | Status: SHIPPED | OUTPATIENT
Start: 2019-08-05 | End: 2019-08-12

## 2019-08-05 RX ORDER — SODIUM CHLORIDE, SODIUM LACTATE, POTASSIUM CHLORIDE, CALCIUM CHLORIDE 600; 310; 30; 20 MG/100ML; MG/100ML; MG/100ML; MG/100ML
INJECTION, SOLUTION INTRAVENOUS CONTINUOUS
Status: DISCONTINUED | OUTPATIENT
Start: 2019-08-05 | End: 2019-08-05 | Stop reason: HOSPADM

## 2019-08-05 RX ORDER — AMOXICILLIN 250 MG
2 CAPSULE ORAL 2 TIMES DAILY
Status: DISCONTINUED | OUTPATIENT
Start: 2019-08-05 | End: 2019-08-05 | Stop reason: HOSPADM

## 2019-08-05 RX ORDER — NAPROXEN 500 MG/1
500 TABLET ORAL 2 TIMES DAILY PRN
COMMUNITY

## 2019-08-05 RX ORDER — DIPHENHYDRAMINE HYDROCHLORIDE 50 MG/ML
25 INJECTION INTRAMUSCULAR; INTRAVENOUS ONCE
Status: COMPLETED | OUTPATIENT
Start: 2019-08-05 | End: 2019-08-05

## 2019-08-05 RX ORDER — LISINOPRIL 20 MG/1
20 TABLET ORAL DAILY
Status: DISCONTINUED | OUTPATIENT
Start: 2019-08-05 | End: 2019-08-05 | Stop reason: HOSPADM

## 2019-08-05 RX ORDER — HYDROMORPHONE HYDROCHLORIDE 1 MG/ML
0.1 INJECTION, SOLUTION INTRAMUSCULAR; INTRAVENOUS; SUBCUTANEOUS
Status: DISCONTINUED | OUTPATIENT
Start: 2019-08-05 | End: 2019-08-05 | Stop reason: HOSPADM

## 2019-08-05 RX ORDER — DIPHENHYDRAMINE HYDROCHLORIDE 50 MG/ML
12.5 INJECTION INTRAMUSCULAR; INTRAVENOUS
Status: DISCONTINUED | OUTPATIENT
Start: 2019-08-05 | End: 2019-08-05 | Stop reason: HOSPADM

## 2019-08-05 RX ORDER — MORPHINE SULFATE 4 MG/ML
4 INJECTION, SOLUTION INTRAMUSCULAR; INTRAVENOUS ONCE
Status: COMPLETED | OUTPATIENT
Start: 2019-08-05 | End: 2019-08-05

## 2019-08-05 RX ORDER — LABETALOL HYDROCHLORIDE 5 MG/ML
INJECTION, SOLUTION INTRAVENOUS PRN
Status: DISCONTINUED | OUTPATIENT
Start: 2019-08-05 | End: 2019-08-05 | Stop reason: SURG

## 2019-08-05 RX ORDER — ACETAMINOPHEN 325 MG/1
650 TABLET ORAL ONCE
Status: COMPLETED | OUTPATIENT
Start: 2019-08-05 | End: 2019-08-05

## 2019-08-05 RX ORDER — ROCURONIUM BROMIDE 10 MG/ML
INJECTION, SOLUTION INTRAVENOUS PRN
Status: DISCONTINUED | OUTPATIENT
Start: 2019-08-05 | End: 2019-08-05 | Stop reason: SURG

## 2019-08-05 RX ORDER — HALOPERIDOL 5 MG/ML
1 INJECTION INTRAMUSCULAR
Status: DISCONTINUED | OUTPATIENT
Start: 2019-08-05 | End: 2019-08-05 | Stop reason: HOSPADM

## 2019-08-05 RX ORDER — LISINOPRIL 20 MG/1
20 TABLET ORAL SEE ADMIN INSTRUCTIONS
COMMUNITY
End: 2019-08-16

## 2019-08-05 RX ORDER — HYDROMORPHONE HYDROCHLORIDE 1 MG/ML
0.2 INJECTION, SOLUTION INTRAMUSCULAR; INTRAVENOUS; SUBCUTANEOUS
Status: DISCONTINUED | OUTPATIENT
Start: 2019-08-05 | End: 2019-08-05 | Stop reason: HOSPADM

## 2019-08-05 RX ORDER — ONDANSETRON 4 MG/1
4 TABLET, ORALLY DISINTEGRATING ORAL EVERY 4 HOURS PRN
Status: DISCONTINUED | OUTPATIENT
Start: 2019-08-05 | End: 2019-08-05 | Stop reason: HOSPADM

## 2019-08-05 RX ORDER — OXYCODONE HYDROCHLORIDE AND ACETAMINOPHEN 5; 325 MG/1; MG/1
2 TABLET ORAL
Status: COMPLETED | OUTPATIENT
Start: 2019-08-05 | End: 2019-08-05

## 2019-08-05 RX ORDER — ONDANSETRON 2 MG/ML
4 INJECTION INTRAMUSCULAR; INTRAVENOUS EVERY 4 HOURS PRN
Status: DISCONTINUED | OUTPATIENT
Start: 2019-08-05 | End: 2019-08-05 | Stop reason: HOSPADM

## 2019-08-05 RX ORDER — PROMETHAZINE HYDROCHLORIDE 25 MG/1
12.5-25 TABLET ORAL EVERY 4 HOURS PRN
Status: DISCONTINUED | OUTPATIENT
Start: 2019-08-05 | End: 2019-08-05 | Stop reason: HOSPADM

## 2019-08-05 RX ORDER — PROMETHAZINE HYDROCHLORIDE 12.5 MG/1
12.5-25 SUPPOSITORY RECTAL EVERY 4 HOURS PRN
Status: DISCONTINUED | OUTPATIENT
Start: 2019-08-05 | End: 2019-08-05 | Stop reason: HOSPADM

## 2019-08-05 RX ORDER — ACETAMINOPHEN 325 MG/1
650 TABLET ORAL EVERY 6 HOURS PRN
Status: DISCONTINUED | OUTPATIENT
Start: 2019-08-05 | End: 2019-08-05 | Stop reason: HOSPADM

## 2019-08-05 RX ORDER — ONDANSETRON 2 MG/ML
4 INJECTION INTRAMUSCULAR; INTRAVENOUS
Status: DISCONTINUED | OUTPATIENT
Start: 2019-08-05 | End: 2019-08-05 | Stop reason: HOSPADM

## 2019-08-05 RX ORDER — LIDOCAINE HYDROCHLORIDE 40 MG/ML
SOLUTION TOPICAL PRN
Status: DISCONTINUED | OUTPATIENT
Start: 2019-08-05 | End: 2019-08-05 | Stop reason: SURG

## 2019-08-05 RX ORDER — POLYETHYLENE GLYCOL 3350 17 G/17G
1 POWDER, FOR SOLUTION ORAL
Status: DISCONTINUED | OUTPATIENT
Start: 2019-08-05 | End: 2019-08-05 | Stop reason: HOSPADM

## 2019-08-05 RX ORDER — OXYCODONE HYDROCHLORIDE AND ACETAMINOPHEN 5; 325 MG/1; MG/1
1 TABLET ORAL
Status: COMPLETED | OUTPATIENT
Start: 2019-08-05 | End: 2019-08-05

## 2019-08-05 RX ORDER — ONDANSETRON 2 MG/ML
INJECTION INTRAMUSCULAR; INTRAVENOUS PRN
Status: DISCONTINUED | OUTPATIENT
Start: 2019-08-05 | End: 2019-08-05 | Stop reason: SURG

## 2019-08-05 RX ORDER — HYDROMORPHONE HYDROCHLORIDE 1 MG/ML
0.4 INJECTION, SOLUTION INTRAMUSCULAR; INTRAVENOUS; SUBCUTANEOUS
Status: DISCONTINUED | OUTPATIENT
Start: 2019-08-05 | End: 2019-08-05 | Stop reason: HOSPADM

## 2019-08-05 RX ORDER — LABETALOL HYDROCHLORIDE 5 MG/ML
5 INJECTION, SOLUTION INTRAVENOUS
Status: DISCONTINUED | OUTPATIENT
Start: 2019-08-05 | End: 2019-08-05 | Stop reason: HOSPADM

## 2019-08-05 RX ORDER — OXYCODONE HYDROCHLORIDE 5 MG/1
5 TABLET ORAL EVERY 4 HOURS PRN
Status: DISCONTINUED | OUTPATIENT
Start: 2019-08-05 | End: 2019-08-05 | Stop reason: HOSPADM

## 2019-08-05 RX ORDER — CEFAZOLIN SODIUM 1 G/3ML
INJECTION, POWDER, FOR SOLUTION INTRAMUSCULAR; INTRAVENOUS PRN
Status: DISCONTINUED | OUTPATIENT
Start: 2019-08-05 | End: 2019-08-05 | Stop reason: SURG

## 2019-08-05 RX ADMIN — FENTANYL CITRATE 50 MCG: 0.05 INJECTION, SOLUTION INTRAMUSCULAR; INTRAVENOUS at 12:03

## 2019-08-05 RX ADMIN — PROPOFOL 200 MG: 10 INJECTION, EMULSION INTRAVENOUS at 10:29

## 2019-08-05 RX ADMIN — SODIUM CHLORIDE 25 MG: 9 INJECTION, SOLUTION INTRAVENOUS at 14:50

## 2019-08-05 RX ADMIN — ONDANSETRON 4 MG: 2 INJECTION INTRAMUSCULAR; INTRAVENOUS at 08:11

## 2019-08-05 RX ADMIN — OXYCODONE HYDROCHLORIDE 5 MG: 5 TABLET ORAL at 14:21

## 2019-08-05 RX ADMIN — SUGAMMADEX 200 MG: 100 INJECTION, SOLUTION INTRAVENOUS at 11:03

## 2019-08-05 RX ADMIN — FENTANYL CITRATE 50 MCG: 0.05 INJECTION, SOLUTION INTRAMUSCULAR; INTRAVENOUS at 08:11

## 2019-08-05 RX ADMIN — SODIUM CHLORIDE 1375 MG: 9 INJECTION, SOLUTION INTRAVENOUS at 16:39

## 2019-08-05 RX ADMIN — FENTANYL CITRATE 50 MCG: 50 INJECTION, SOLUTION INTRAMUSCULAR; INTRAVENOUS at 10:43

## 2019-08-05 RX ADMIN — OXYCODONE HYDROCHLORIDE AND ACETAMINOPHEN 2 TABLET: 5; 325 TABLET ORAL at 12:02

## 2019-08-05 RX ADMIN — MIDAZOLAM 2 MG: 1 INJECTION INTRAMUSCULAR; INTRAVENOUS at 10:23

## 2019-08-05 RX ADMIN — FENTANYL CITRATE 50 MCG: 50 INJECTION, SOLUTION INTRAMUSCULAR; INTRAVENOUS at 10:34

## 2019-08-05 RX ADMIN — CEFAZOLIN 2 G: 330 INJECTION, POWDER, FOR SOLUTION INTRAMUSCULAR; INTRAVENOUS at 10:30

## 2019-08-05 RX ADMIN — SODIUM CHLORIDE, POTASSIUM CHLORIDE, SODIUM LACTATE AND CALCIUM CHLORIDE: 600; 310; 30; 20 INJECTION, SOLUTION INTRAVENOUS at 11:09

## 2019-08-05 RX ADMIN — LIDOCAINE HYDROCHLORIDE 4 ML: 40 SOLUTION TOPICAL at 10:29

## 2019-08-05 RX ADMIN — ROCURONIUM BROMIDE 50 MG: 10 INJECTION, SOLUTION INTRAVENOUS at 10:34

## 2019-08-05 RX ADMIN — DIPHENHYDRAMINE HYDROCHLORIDE 25 MG: 50 INJECTION INTRAMUSCULAR; INTRAVENOUS at 14:43

## 2019-08-05 RX ADMIN — ONDANSETRON 4 MG: 4 TABLET, ORALLY DISINTEGRATING ORAL at 14:20

## 2019-08-05 RX ADMIN — ONDANSETRON 4 MG: 2 INJECTION INTRAMUSCULAR; INTRAVENOUS at 11:03

## 2019-08-05 RX ADMIN — FENTANYL CITRATE 100 MCG: 50 INJECTION, SOLUTION INTRAMUSCULAR; INTRAVENOUS at 10:29

## 2019-08-05 RX ADMIN — SODIUM CHLORIDE, POTASSIUM CHLORIDE, SODIUM LACTATE AND CALCIUM CHLORIDE: 600; 310; 30; 20 INJECTION, SOLUTION INTRAVENOUS at 10:12

## 2019-08-05 RX ADMIN — DEXAMETHASONE SODIUM PHOSPHATE 8 MG: 4 INJECTION, SOLUTION INTRA-ARTICULAR; INTRALESIONAL; INTRAMUSCULAR; INTRAVENOUS; SOFT TISSUE at 10:35

## 2019-08-05 RX ADMIN — ACETAMINOPHEN 650 MG: 325 TABLET, FILM COATED ORAL at 14:42

## 2019-08-05 RX ADMIN — MORPHINE SULFATE 4 MG: 4 INJECTION INTRAVENOUS at 06:31

## 2019-08-05 RX ADMIN — FENTANYL CITRATE 50 MCG: 50 INJECTION, SOLUTION INTRAMUSCULAR; INTRAVENOUS at 11:05

## 2019-08-05 RX ADMIN — LABETALOL HYDROCHLORIDE 5 MG: 5 INJECTION, SOLUTION INTRAVENOUS at 10:44

## 2019-08-05 ASSESSMENT — ENCOUNTER SYMPTOMS
DEPRESSION: 0
PSYCHIATRIC NEGATIVE: 1
DIARRHEA: 0
SHORTNESS OF BREATH: 0
COUGH: 0
ABDOMINAL PAIN: 1
FOCAL WEAKNESS: 0
HEMOPTYSIS: 0
BLOOD IN STOOL: 0
CHILLS: 1
DIARRHEA: 1
CARDIOVASCULAR NEGATIVE: 1
DIAPHORESIS: 1
FEVER: 0
NAUSEA: 1
FEVER: 1
CONSTIPATION: 0
SEIZURES: 0
DOUBLE VISION: 0
VOMITING: 0
BACK PAIN: 1
PALPITATIONS: 0
INSOMNIA: 0
HEARTBURN: 0
DIZZINESS: 0
SHORTNESS OF BREATH: 1
WHEEZING: 0
EYES NEGATIVE: 1
CHILLS: 0
VOMITING: 1
LOSS OF CONSCIOUSNESS: 0
HEADACHES: 0
NERVOUS/ANXIOUS: 0
NEUROLOGICAL NEGATIVE: 1
BRUISES/BLEEDS EASILY: 0

## 2019-08-05 ASSESSMENT — LIFESTYLE VARIABLES
EVER_SMOKED: YES
HAVE YOU EVER FELT YOU SHOULD CUT DOWN ON YOUR DRINKING: NO
TOTAL SCORE: 0
AVERAGE NUMBER OF DAYS PER WEEK YOU HAVE A DRINK CONTAINING ALCOHOL: 0
ALCOHOL_USE: NO
TOTAL SCORE: 0
HOW MANY TIMES IN THE PAST YEAR HAVE YOU HAD 5 OR MORE DRINKS IN A DAY: 0
DOES PATIENT WANT TO STOP DRINKING: NO
ON A TYPICAL DAY WHEN YOU DRINK ALCOHOL HOW MANY DRINKS DO YOU HAVE: 0
CONSUMPTION TOTAL: NEGATIVE
EVER HAD A DRINK FIRST THING IN THE MORNING TO STEADY YOUR NERVES TO GET RID OF A HANGOVER: NO
HAVE PEOPLE ANNOYED YOU BY CRITICIZING YOUR DRINKING: NO
TOTAL SCORE: 0
EVER FELT BAD OR GUILTY ABOUT YOUR DRINKING: NO
DO YOU DRINK ALCOHOL: NO

## 2019-08-05 ASSESSMENT — COPD QUESTIONNAIRES
DURING THE PAST 4 WEEKS HOW MUCH DID YOU FEEL SHORT OF BREATH: NONE/LITTLE OF THE TIME
DO YOU EVER COUGH UP ANY MUCUS OR PHLEGM?: NO/ONLY WITH OCCASIONAL COLDS OR INFECTIONS
IN THE PAST 12 MONTHS DO YOU DO LESS THAN YOU USED TO BECAUSE OF YOUR BREATHING PROBLEMS: DISAGREE/UNSURE
COPD SCREENING SCORE: 2
HAVE YOU SMOKED AT LEAST 100 CIGARETTES IN YOUR ENTIRE LIFE: YES

## 2019-08-05 ASSESSMENT — PATIENT HEALTH QUESTIONNAIRE - PHQ9
2. FEELING DOWN, DEPRESSED, IRRITABLE, OR HOPELESS: NOT AT ALL
2. FEELING DOWN, DEPRESSED, IRRITABLE, OR HOPELESS: NOT AT ALL
SUM OF ALL RESPONSES TO PHQ9 QUESTIONS 1 AND 2: 0
SUM OF ALL RESPONSES TO PHQ9 QUESTIONS 1 AND 2: 0
1. LITTLE INTEREST OR PLEASURE IN DOING THINGS: NOT AT ALL
1. LITTLE INTEREST OR PLEASURE IN DOING THINGS: NOT AT ALL

## 2019-08-05 NOTE — ANESTHESIA PROCEDURE NOTES
Airway  Date/Time: 8/5/2019 10:29 AM  Performed by: Jonatan Sanchez M.D.  Authorized by: Jonatan Sanchez M.D.     Location:  OR  Urgency:  Elective  Difficult Airway: No    Indications for Airway Management:  Anesthesia  Spontaneous Ventilation: absent    Sedation Level:  Deep  Preoxygenated: Yes    Patient Position:  Sniffing  Final Airway Type:  Endotracheal airway  Final Endotracheal Airway:  ETT  Cuffed: Yes    Technique Used for Successful ETT Placement:  Direct laryngoscopy  Devices/Methods Used in Placement:  Cricoid pressure  Insertion Site:  Oral  Blade Type:  Collene  Laryngoscope Blade/Videolaryngoscope Blade Size:  3  ETT Size (mm):  6.5  Measured from:  Teeth  ETT to Teeth (cm):  20  Placement Verified by: auscultation and capnometry    Cormack-Lehane Classification:  Grade I - full view of glottis  Number of Attempts at Approach:  1   araumatic DLx1 RSI with cricoid pressure

## 2019-08-05 NOTE — H&P
Surgical H&P    Date: 8/5/2019    Requesting Physician: Dr. Diana  PCP: Kateryna Mcconnell M.D.  Attending Physician: Nik Schumacher M.D.    CC: Epigastric and right upper quadrant pain    HPI: This is a 48 y.o. female who is presenting with approximately 1 day of right upper quadrant and epigastric pain which was postprandial, and associated with nausea and vomiting.  No fever but mild chills.  She presented to the emergency department where she was found to have a mild leukocytosis, and ultrasound evidence of cholelithiasis and small amount of pericholecystic fluid.  Normal bile duct diameter.  Normal liver function tests and bilirubin.  She has had approximately 6 months of heavy menstrual bleeding, which is regular and associated with her normal period.  She has not seen a physician regarding this.  Her hemoglobin is 7.9, and she just finished her period approximately 1 week ago.  No chest pain, shortness of breath, hematochezia, melena, constipation, diarrhea, hematemesis, hemoptysis, neurologic symptoms.    Past Medical History:   Diagnosis Date   • Hyperlipidemia    • Hypertension        Past Surgical History:   Procedure Laterality Date   • APPENDECTOMY         No current facility-administered medications for this encounter.      Current Outpatient Medications   Medication Sig Dispense Refill   • lisinopril (PRINIVIL) 20 MG Tab Take 20 mg by mouth See Admin Instructions. Take 20 mg in the AM  May take 10 mg in the PM if needed     • naproxen (NAPROSYN) 500 MG Tab Take 500 mg by mouth 2 times a day as needed.     • omeprazole (PRILOSEC) 20 MG delayed-release capsule Take 40 mg by mouth every day.         Social History     Socioeconomic History   • Marital status:      Spouse name: Not on file   • Number of children: Not on file   • Years of education: Not on file   • Highest education level: Not on file   Occupational History   • Not on file   Social Needs   • Financial resource strain: Not on file   •  "Food insecurity:     Worry: Not on file     Inability: Not on file   • Transportation needs:     Medical: Not on file     Non-medical: Not on file   Tobacco Use   • Smoking status: Current Some Day Smoker     Packs/day: 0.25     Types: Cigarettes   • Smokeless tobacco: Never Used   Substance and Sexual Activity   • Alcohol use: Yes     Comment: occ   • Drug use: No   • Sexual activity: Yes     Partners: Female   Lifestyle   • Physical activity:     Days per week: Not on file     Minutes per session: Not on file   • Stress: Not on file   Relationships   • Social connections:     Talks on phone: Not on file     Gets together: Not on file     Attends Nondenominational service: Not on file     Active member of club or organization: Not on file     Attends meetings of clubs or organizations: Not on file     Relationship status: Not on file   • Intimate partner violence:     Fear of current or ex partner: Not on file     Emotionally abused: Not on file     Physically abused: Not on file     Forced sexual activity: Not on file   Other Topics Concern   • Not on file   Social History Narrative   • Not on file       Family History   Problem Relation Age of Onset   • Diabetes Mother    • Diabetes Father    • No Known Problems Sister    • No Known Problems Brother    • No Known Problems Sister    • No Known Problems Brother    • No Known Problems Brother        Allergies:  Patient has no known allergies.    Review of Systems:  Negative except as noted above in HPI on 10 point review    Physical Exam:  /64   Pulse 65   Temp 36.6 °C (97.9 °F) (Temporal)   Resp 14   Ht 1.575 m (5' 2\")   Wt 76.3 kg (168 lb 3.4 oz)   SpO2 100%     Constitutional: she is oriented to person, place, and time.  she appears well-developed and well-nourished. No acute distress.   Head: Normocephalic and atraumatic.   Neck: Normal range of motion. Neck supple. No JVD present. No tracheal deviation present.   Cardiovascular: Normal rate, regular " rhythm  Pulmonary/Chest: Breathing is nonlabored on room air, no stridor, no respiratory distress  Abdominal: Soft, nondistended, mildly obese, tender to palpation in the epigastrium and right upper quadrant with mild voluntary guarding on inspiration consistent with Izaguirre sign  Musculoskeletal: Normal range of motion. she exhibits no edema and no tenderness.   Neurological: she is alert and oriented to person, place, and time.  No gross motor or sensory deficit  Skin: Skin is warm and dry. No rash noted. she is not diaphoretic. No erythema. No pallor.   Psychiatric: she has a normal mood and affect.  Behavior is appropriate.       Labs:  Recent Labs     08/05/19  0556   WBC 11.0*   RBC 4.55   HEMOGLOBIN 7.9*   HEMATOCRIT 30.0*   MCV 65.9*   MCH 17.4*   MCHC 26.3*   RDW 45.0   PLATELETCT 712*   MPV 9.1     Recent Labs     08/05/19  0556   SODIUM 140   POTASSIUM 3.9   CHLORIDE 107   CO2 24   GLUCOSE 101*   BUN 9   CREATININE 0.55   CALCIUM 9.8         Recent Labs     08/05/19  0556   ASTSGOT 18   ALTSGPT 14   TBILIRUBIN 0.4   ALKPHOSPHAT 66   GLOBULIN 2.6       Radiology:  US-RUQ   Final Result      1.  Cholelithiasis with trace pericholecystic fluid. Findings may represent acute cholecystitis      2.  Clinical summary. Sonographic Izaguirre sign is positive.          Assessment: This is a 48 y.o. female with clinical signs and symptoms of early acute cholecystitis secondary to cholelithiasis.  She is hemodynamically stable.  She also has relatively new onset menorrhagia and is anemic.  No evidence of GI bleed based on clinical history.  No recent colonoscopy.      Recommendations:   -I recommend OR for laparoscopic cholecystectomy.  The operation was discussed along with the risks, which include but are not limited to bleeding, infection, damage to surrounding structures, need for further procedures, bile duct injury, bile leak, retained bile duct stone, pneumonia, death.  The benefits and alternatives were also  discussed and the patient wishes to proceed.  -I have asked Dr. Diana to obtain the appropriate consultation for work-up of menorrhagia.  If anything is required inpatient, she will need to be admitted to the hospitalist service.      Nik Schumacher M.D.  [facility]  [phone]

## 2019-08-05 NOTE — ASSESSMENT & PLAN NOTE
Continue with pain management.  Patient has had some upper extremity pain in the past but currently she is pain-free.

## 2019-08-05 NOTE — ED PROVIDER NOTES
ED Provider Note    ED Provider Note    Primary care provider: Kateryna Mcconnell M.D.  Means of arrival: POV  History obtained from: Patient  History limited by: None    CHIEF COMPLAINT  Chief Complaint   Patient presents with   • Abdominal Pain     Epigastric, RUQ pain since ~2100 last noc.  Hx cholelithiasis       HPI  Larisa Pinzon is a 48 y.o. female who presents to the Emergency Department with a chief complaint of epigastric and right upper quadrant abdominal pain that started at 7 PM approximately last night.  She has had prior similar symptoms.  This is about 3 months ago, she was seen at [facility] and was diagnosed with gallstones.  She was referred to surgery but she never followed up with anyone.  She did well for many weeks.  Pain returned last night.  She complains of nausea and vomiting but no diarrhea.  No urinary symptoms.  No chest pain or shortness of breath.  No fever.  Pain is worsened with certain foods.  Nothing seems to make the pain better.  She has a history of hypertension she is had her appendix removed.  She does smoke, she does not drink or use alcohol.  She has no allergies.    REVIEW OF SYSTEMS  Review of Systems   Constitutional: Negative for chills and fever.   HENT: Negative for congestion.    Respiratory: Negative for shortness of breath.    Cardiovascular: Negative for chest pain.   Gastrointestinal: Positive for abdominal pain, nausea and vomiting. Negative for diarrhea.   Genitourinary: Negative for dysuria.   Musculoskeletal: Positive for back pain.   Skin: Negative for rash.   Neurological: Negative for headaches.   All other systems reviewed and are negative.      PAST MEDICAL HISTORY   has a past medical history of Hyperlipidemia and Hypertension.    SURGICAL HISTORY   has a past surgical history that includes appendectomy.    SOCIAL HISTORY  Social History     Tobacco Use   • Smoking status: Current Some Day Smoker      "Packs/day: 0.25     Types: Cigarettes   • Smokeless tobacco: Never Used   Substance Use Topics   • Alcohol use: Yes     Comment: occ   • Drug use: No      Social History     Substance and Sexual Activity   Drug Use No       FAMILY HISTORY  Family History   Problem Relation Age of Onset   • Diabetes Mother    • Diabetes Father    • No Known Problems Sister    • No Known Problems Brother    • No Known Problems Sister    • No Known Problems Brother    • No Known Problems Brother        CURRENT MEDICATIONS  Home Medications     Reviewed by Anabell Neves R.N. (Registered Nurse) on 08/05/19 at 1048  Med List Status: Complete   Medication Last Dose Status   ceFAZolin (ANCEF) injection  Active   dexamethasone (DECADRON) injection  Active   fentaNYL (SUBLIMAZE) injection  Active   labetalol (NORMODYNE,TRANDATE) injection  Active   Lactated Ringers  Active   lidocaine 4% (LYRING-O-JET) topical soln  Active   lisinopril (PRINIVIL) 20 MG Tab 8/4/2019 Active   midazolam (VERSED) 2 MG/2ML injection  Active   naproxen (NAPROSYN) 500 MG Tab FEW DAYS AGO Active   omeprazole (PRILOSEC) 20 MG delayed-release capsule 8/4/2019 Active   propofol (DIPRIVAN) injection  Active   rocuronium (ZEMURON) injection  Active                ALLERGIES  No Known Allergies    PHYSICAL EXAM  VITAL SIGNS: /40   Pulse 67   Temp 36.4 °C (97.5 °F) (Temporal)   Resp 17   Ht 1.575 m (5' 2\")   Wt 76.3 kg (168 lb 3.4 oz)   LMP 07/26/2019   SpO2 98%   BMI 30.77 kg/m²   Vitals reviewed.  Constitutional: Patient is oriented to person, place, and time. Appears well-developed and well-nourished. Mild distress.    Head: Normocephalic and atraumatic.   Ears: Normal external ears bilaterally.   Mouth/Throat: Oropharynx is clear and moist  Eyes: Conjunctivae are normal. Pupils are equal, round, and reactive to light.   Neck: Normal range of motion. Neck supple.  Cardiovascular: Normal rate, regular rhythm and normal heart sounds.   Pulmonary/Chest: Effort " normal and breath sounds normal. No respiratory distress, no wheezes, rhonchi, or rales. No chest wall tenderness.  Abdominal: Soft. Bowel sounds are normal. There is epigastric and right upper quadrant tenderness. No rebound or guarding, or peritoneal signs. No CVA tenderness.  Rectal: scant stool in vault. guaiac negative.  Musculoskeletal: No edema and no tenderness.   Neurological: No focal deficits.   Skin: Skin is warm and dry. No erythema. No pallor.   Psychiatric: Patient has a normal mood and affect.     LABS  Results for orders placed or performed during the hospital encounter of 08/05/19   CBC WITH DIFFERENTIAL   Result Value Ref Range    WBC 11.0 (H) 4.8 - 10.8 K/uL    RBC 4.55 4.20 - 5.40 M/uL    Hemoglobin 7.9 (L) 12.0 - 16.0 g/dL    Hematocrit 30.0 (L) 37.0 - 47.0 %    MCV 65.9 (L) 81.4 - 97.8 fL    MCH 17.4 (L) 27.0 - 33.0 pg    MCHC 26.3 (L) 33.6 - 35.0 g/dL    RDW 45.0 35.9 - 50.0 fL    Platelet Count 712 (H) 164 - 446 K/uL    MPV 9.1 9.0 - 12.9 fL    Neutrophils-Polys 72.40 (H) 44.00 - 72.00 %    Lymphocytes 16.70 (L) 22.00 - 41.00 %    Monocytes 6.70 0.00 - 13.40 %    Eosinophils 2.30 0.00 - 6.90 %    Basophils 0.80 0.00 - 1.80 %    Immature Granulocytes 1.10 (H) 0.00 - 0.90 %    Nucleated RBC 0.20 /100 WBC    Neutrophils (Absolute) 7.98 (H) 2.00 - 7.15 K/uL    Lymphs (Absolute) 1.84 1.00 - 4.80 K/uL    Monos (Absolute) 0.74 0.00 - 0.85 K/uL    Eos (Absolute) 0.25 0.00 - 0.51 K/uL    Baso (Absolute) 0.09 0.00 - 0.12 K/uL    Immature Granulocytes (abs) 0.12 (H) 0.00 - 0.11 K/uL    NRBC (Absolute) 0.02 K/uL    Hypochromia 1+     Anisocytosis 1+     Microcytosis 2+    COMP METABOLIC PANEL   Result Value Ref Range    Sodium 140 135 - 145 mmol/L    Potassium 3.9 3.6 - 5.5 mmol/L    Chloride 107 96 - 112 mmol/L    Co2 24 20 - 33 mmol/L    Anion Gap 9.0 0.0 - 11.9    Glucose 101 (H) 65 - 99 mg/dL    Bun 9 8 - 22 mg/dL    Creatinine 0.55 0.50 - 1.40 mg/dL    Calcium 9.8 8.5 - 10.5 mg/dL    AST(SGOT) 18  12 - 45 U/L    ALT(SGPT) 14 2 - 50 U/L    Alkaline Phosphatase 66 30 - 99 U/L    Total Bilirubin 0.4 0.1 - 1.5 mg/dL    Albumin 4.5 3.2 - 4.9 g/dL    Total Protein 7.1 6.0 - 8.2 g/dL    Globulin 2.6 1.9 - 3.5 g/dL    A-G Ratio 1.7 g/dL   LIPASE   Result Value Ref Range    Lipase 24 11 - 82 U/L   ESTIMATED GFR   Result Value Ref Range    GFR If African American >60 >60 mL/min/1.73 m 2    GFR If Non African American >60 >60 mL/min/1.73 m 2   PERIPHERAL SMEAR REVIEW   Result Value Ref Range    Peripheral Smear Review see below    PLATELET ESTIMATE   Result Value Ref Range    Plt Estimation Increased    MORPHOLOGY   Result Value Ref Range    RBC Morphology Present     Polychromia 1+     Poikilocytosis 1+     Ovalocytes 1+    DIFFERENTIAL COMMENT   Result Value Ref Range    Comments-Diff see below    Lipid Profile   Result Value Ref Range    Cholesterol,Tot 138 100 - 199 mg/dL    Triglycerides 125 0 - 149 mg/dL    HDL 32 (A) >=40 mg/dL    LDL 81 <100 mg/dL   TSH   Result Value Ref Range    TSH 2.480 0.380 - 5.330 uIU/mL   IRON/TOTAL IRON BIND   Result Value Ref Range    Iron 16 (L) 40 - 170 ug/dL    Total Iron Binding 511 (H) 250 - 450 ug/dL    % Saturation 3 (L) 15 - 55 %   Histology Request   Result Value Ref Range    Pathology Request Sent to Histo    POC URINE PREGNANCY   Result Value Ref Range    POC Urine Pregnancy Test Negative Negative       All labs reviewed by me.    RADIOLOGY  US-RUQ   Final Result      1.  Cholelithiasis with trace pericholecystic fluid. Findings may represent acute cholecystitis      2.  Clinical summary. Sonographic Izaguirre sign is positive.        The radiologist's interpretation of all radiological studies have been reviewed by me.    COURSE & MEDICAL DECISION MAKING  Pertinent Labs & Imaging studies reviewed. (See chart for details)    Obtained and reviewed past medical records.  Patient's last ED encounter was in 2014 for dental pain.    6:29 AM - Patient seen and examined at bedside.   This is a pleasant 48-year-old female who presents with epigastric and right upper quadrant pain in the setting of known gallstones.  Symptoms onset about 12 hours ago.  No fever.  IV started, labs drawn per nursing protocols.  Patient is treated with morphine.  She will be kept n.p.o.  Will evaluate further with ultrasound.    The differential diagnoses include but are not limited to: Cholecystitis, pancreatitis, gastritis.    8:53 AM data reviewed.  Patient is a mild elevation in her white blood cell count of 11.  Hemoglobin is 7.9.  Hematocrit 30.  Chemistry otherwise unrevealing.  Ultrasound suggestive of acute cholecystitis with evidence of gallstones with trace pericholecystic fluid.  In the clinical setting, this would fit.  Will contact general surgery.  Patient last ate last night.  Will contact general surgery consultation.  Patient does note heavy periods.  No change in the color of her stool.  No black or tarry stools.  She does not use a restricted diet.  She is not anticoagulated.    9:23 AM discussed with Dr. Schumacher, general surgeon on-call who agrees to see the patient in consultation.  He will plan to take of her gallbladder out today. However, he would like the patient admitted to the medicine service to evaluate her anemia.    0948AM DISCUSSED WITH DR. JANE, HOSPITALIST VIA TIGER TEXT WHO AGREES TO ADMIT THE PATIENT TO THEIR SERVICE.  HE IS AWARE, PATIENT WILL BE going to the operating room shortly for cholecystectomy and that she is anemic for unclear reasons.    FINAL IMPRESSION  1. Acute cholecystitis    2. Anemia, unspecified type    3. Epigastric and RUQ abd pain

## 2019-08-05 NOTE — ED TRIAGE NOTES
"Chief Complaint   Patient presents with   • Abdominal Pain     Epigastric, RUQ pain since ~2100 last noc.  Hx cholelithiasis     /76   Pulse 63   Temp 36.6 °C (97.9 °F) (Temporal)   Resp 20   Ht 1.575 m (5' 2\")   Wt 76.3 kg (168 lb 3.4 oz)   LMP 07/26/2019   SpO2 100%   BMI 30.77 kg/m²     49 y/o female, with known hx cholelithiasis, presents to ED with complaint of severe RUQ/epigastric abdominal pain, onset ~2100 last noc.  Patient states she was diagnosed with gallstones ~3 months ago at [facility]. +Associated N/V.  No F/C, no dysuria, no chest pain.    "

## 2019-08-05 NOTE — PROGRESS NOTES
Pt admitted to CDU for anemia work up, s/p lap hanna. A&Ox4. Pt has 4 lap stabs to abdomen. Edges are well approximated and clean/dry/intact. Pt complains of 7-8/10 pain level and nausea. Will medicate per MAR. Ice packs provided to patient. POC discussed including encouraged oral intake and need to urinate.

## 2019-08-05 NOTE — ASSESSMENT & PLAN NOTE
Microcytic anemia at this point points more toward a thalassemia or sickle cell type of picture we will get at this point a hemoglobin electrophoresis.  We will also check at this point iron panel and B12 and folic acid level.

## 2019-08-05 NOTE — ASSESSMENT & PLAN NOTE
Continue with blood pressure management optimization currently patient is on lisinopril 20 mg daily.  Will optimize as needed medications to keep blood pressure under 140 systolic and under 90 diastolic.

## 2019-08-05 NOTE — ANESTHESIA PREPROCEDURE EVALUATION
Acute cholecystitis    Relevant Problems   CARDIAC   (+) Essential hypertension   GERD  anemia Hct 30    Physical Exam    Airway   Mallampati: II  TM distance: >3 FB  Neck ROM: full       Cardiovascular - normal exam  Rhythm: regular  Rate: normal  (-) murmur     Dental - normal exam         Pulmonary - normal exam  Breath sounds clear to auscultation     Abdominal    Neurological - normal exam                 Anesthesia Plan    ASA 2 - emergent       Plan - general       Airway plan will be ETT        Induction: intravenous    Postoperative Plan: Postoperative administration of opioids is intended.    Pertinent diagnostic labs and testing reviewed    Informed Consent:    Anesthetic plan and risks discussed with patient.    Use of blood products discussed with: patient whom consented to blood products.

## 2019-08-05 NOTE — OR SURGEON
Immediate Post OP Note    PreOp Diagnosis:   1.  Acute cholecystitis secondary to cholelithiasis  2.  Anemia    PostOp Diagnosis: Same    Procedure(s):  CHOLECYSTECTOMY, LAPAROSCOPIC - Wound Class: Clean Contaminated    Surgeon(s):  Nik Schumacher M.D.    Anesthesiologist/Type of Anesthesia:  Anesthesiologist: Jonatan Sanchez M.D./General    Surgical Staff:  Assistant: Ariana Dumont  Circulator: Anabell Neves R.N.  Scrub Person: Melonie Riley    Specimens removed if any:  ID Type Source Tests Collected by Time Destination   A :  Tissue Gallbladder PATHOLOGY SPECIMEN Nik Schumacher M.D. 8/5/2019 10:58 AM        Estimated Blood Loss: <10mL    Findings: Acutely inflamed, thickened gallbladder containing multiple stones    Complications: None noted    Disposition: Transferred to PACU in stable condition.  The patient will be admitted to the hospitalist medical service for work-up of anemia.    8/5/2019 11:11 AM Nik Schumacher M.D.

## 2019-08-05 NOTE — H&P
Hospital Medicine History & Physical Note    Date of Service  8/5/2019    Primary Care Physician  Kateryna Mcconnell M.D.    Consultants  Surgery, Dr. Schumacher    Code Status  Full code    Chief Complaint  Right upper quadrant abdominal pain    History of Presenting Illness  48 y.o. female who presented 8/5/2019 with sudden onset of right upper quadrant abdominal pain that occurred around 10 PM last night.  She says is a 10 out of 10.  The pain is accompanied by nausea, vomiting, headache, dizziness, diaphoresis, fevers and chills, diarrhea.  Patient was evaluated in the emergency room was found to have acute cholecystitis.  She is known that she had cholelithiasis before.  The patient is also anemic, she is microcytic and fits the range of possible thalassemia versus sickle cell disease.  We will obtain at this point a hemoglobin electrophoresis will also check iron panel B12 and folic acid level.  The patient at this point has history of hypertension we will continue with blood pressure management optimization.  She at this point will be going for surgery today.    Review of Systems  Review of Systems   Constitutional: Positive for chills, diaphoresis and fever.   HENT: Negative.    Eyes: Negative.  Negative for double vision.   Respiratory: Positive for shortness of breath. Negative for cough, hemoptysis and wheezing.    Cardiovascular: Negative.  Negative for chest pain, palpitations and leg swelling.   Gastrointestinal: Positive for abdominal pain, diarrhea and nausea. Negative for blood in stool, constipation, heartburn and vomiting.   Genitourinary: Negative.  Negative for frequency, hematuria and urgency.   Musculoskeletal: Positive for back pain. Negative for joint pain.   Skin: Negative.  Negative for itching and rash.   Neurological: Negative.  Negative for dizziness, focal weakness, seizures, loss of consciousness and headaches.   Endo/Heme/Allergies: Negative.  Does not bruise/bleed easily.    Psychiatric/Behavioral: Negative.  Negative for depression and suicidal ideas. The patient is not nervous/anxious and does not have insomnia.    All other systems reviewed and are negative.      Past Medical History   has a past medical history of Hyperlipidemia and Hypertension.    Surgical History   has a past surgical history that includes appendectomy.     Family History  family history includes Diabetes in her father and mother; No Known Problems in her brother, brother, brother, sister, and sister.     Social History   reports that she has been smoking cigarettes. She has been smoking about 0.25 packs per day. She has never used smokeless tobacco. She reports that she drinks alcohol. She reports that she does not use drugs.    Allergies  No Known Allergies    Medications  Prior to Admission Medications   Prescriptions Last Dose Informant Patient Reported? Taking?   lisinopril (PRINIVIL) 20 MG Tab 8/4/2019 at PM Patient Yes Yes   Sig: Take 20 mg by mouth See Admin Instructions. Take 20 mg in the AM  May take 10 mg in the PM if needed   naproxen (NAPROSYN) 500 MG Tab FEW DAYS AGO at PRN Patient Yes Yes   Sig: Take 500 mg by mouth 2 times a day as needed.   omeprazole (PRILOSEC) 20 MG delayed-release capsule 8/4/2019 at AM Patient Yes Yes   Sig: Take 40 mg by mouth every day.      Facility-Administered Medications: None       Physical Exam  Temp:  [36.2 °C (97.1 °F)-36.6 °C (97.9 °F)] 36.2 °C (97.1 °F)  Pulse:  [63-68] 65  Resp:  [14-20] 14  BP: (125-157)/(55-85) 125/55  SpO2:  [100 %] 100 %    Physical Exam   Constitutional: She is oriented to person, place, and time. She appears well-developed and well-nourished.   HENT:   Head: Normocephalic and atraumatic.   Right Ear: External ear normal.   Left Ear: External ear normal.   Nose: Nose normal.   Mouth/Throat: Oropharynx is clear and moist.   Eyes: Pupils are equal, round, and reactive to light. Conjunctivae and EOM are normal.   Neck: Normal range of motion.  Neck supple. No JVD present. No thyromegaly present.   Cardiovascular: Normal rate and regular rhythm.   No murmur heard.  Pulmonary/Chest: Effort normal. No accessory muscle usage. No tachypnea. No respiratory distress. She has decreased breath sounds in the right lower field and the left lower field. She has no wheezes. She has no rales. She exhibits no tenderness.   Abdominal: Soft. She exhibits distension. She exhibits no mass. There is tenderness. There is rebound and guarding.   Musculoskeletal: Normal range of motion. She exhibits no edema or tenderness.   Lymphadenopathy:     She has no cervical adenopathy.   Neurological: She is alert and oriented to person, place, and time. She has normal reflexes. No cranial nerve deficit. GCS eye subscore is 4. GCS verbal subscore is 5. GCS motor subscore is 6.   Skin: Skin is warm and dry. No rash noted. No erythema.   Psychiatric: She has a normal mood and affect. Her behavior is normal. Judgment and thought content normal.   Nursing note and vitals reviewed.      Laboratory:  Recent Labs     08/05/19  0556   WBC 11.0*   RBC 4.55   HEMOGLOBIN 7.9*   HEMATOCRIT 30.0*   MCV 65.9*   MCH 17.4*   MCHC 26.3*   RDW 45.0   PLATELETCT 712*   MPV 9.1     Recent Labs     08/05/19  0556   SODIUM 140   POTASSIUM 3.9   CHLORIDE 107   CO2 24   GLUCOSE 101*   BUN 9   CREATININE 0.55   CALCIUM 9.8     Recent Labs     08/05/19  0556   ALTSGPT 14   ASTSGOT 18   ALKPHOSPHAT 66   TBILIRUBIN 0.4   LIPASE 24   GLUCOSE 101*         No results for input(s): NTPROBNP in the last 72 hours.      No results for input(s): TROPONINT in the last 72 hours.    Urinalysis:    No results found     Imaging:  US-RUQ   Final Result      1.  Cholelithiasis with trace pericholecystic fluid. Findings may represent acute cholecystitis      2.  Clinical summary. Sonographic Izaguirre sign is positive.            Assessment/Plan:  I anticipate this patient is appropriate for observation status at this time.    *  Acute cholecystitis  Assessment & Plan  Patient with known gallstones last night started to have severe cramping abdominal pain in the right upper quadrant.  It was 10 out of 10 intensity she could not sleep from it all night long.  The patient does comes in his evaluation is found to have acute cholecystitis.  The patient will be going for surgical removal of the gallbladder shortly.  Postoperatively patient will be evaluated by continued pain optimization blood pressure optimization and monitoring of her hemoglobin hematocrit.  If things are stable overnight patient most likely can discharge tomorrow morning.    Microcytic anemia  Assessment & Plan  Microcytic anemia at this point points more toward a thalassemia or sickle cell type of picture we will get at this point a hemoglobin electrophoresis.  We will also check at this point iron panel and B12 and folic acid level.    Essential hypertension- (present on admission)  Assessment & Plan  Continue with blood pressure management optimization currently patient is on lisinopril 20 mg daily.  Will optimize as needed medications to keep blood pressure under 140 systolic and under 90 diastolic.    Thrombocytosis (HCC)  Assessment & Plan  Secondary to the acute inflammation.  Trend to see if this goes down back to normal.    Leukocytosis  Assessment & Plan  This is secondary to the acute cholecystitis with inflammatory response.    Hypercholesterolemia- (present on admission)  Assessment & Plan  Will need low-fat low-cholesterol diet and then statin.  Obtain a fasting lipid panel.      Cervical radiculopathy- (present on admission)  Assessment & Plan  Continue with pain management.  Patient has had some upper extremity pain in the past but currently she is pain-free.      VTE prophylaxis: SCDs

## 2019-08-05 NOTE — PROGRESS NOTES
IV Iron Per Pharmacy Note    Patient Lean Body Weight:  50 kg  Reason for Iron Replacement: Iron Deficiency Anemia      Lab Results   Component Value Date/Time    WBC 11.0 (H) 08/05/2019 05:56 AM    RBC 4.55 08/05/2019 05:56 AM    HEMOGLOBIN 7.9 (L) 08/05/2019 05:56 AM    HEMATOCRIT 30.0 (L) 08/05/2019 05:56 AM    MCV 65.9 (L) 08/05/2019 05:56 AM    MCH 17.4 (L) 08/05/2019 05:56 AM    MCHC 26.3 (L) 08/05/2019 05:56 AM    MPV 9.1 08/05/2019 05:56 AM       Recent Labs     08/05/19  1123   FERRITIN 2.7*   FOLATE 9.4   IRON 16*         Recent Labs     08/05/19  0556   CREATININE 0.55          Assessment/Plan:  1. IV Iron Indicated.   2. Give Iron Dextran 25 mg IV test dose following diphenhydramine/acetaminophen premeds over 30 minutes per protocol.  3. If no reaction (Anaphylaxis, Hypotension/Hypertension, N/V/D, Chest pain/Back Pain, Urticaria/Pruritis) in the next hour, proceed to full dose. Nursing to call the pharmacy IV room at ext. [phone] for full dose.  4. Full dose: Iron Dextran 1375 mg IV over 4 hours. Continue to monitor for delayed ADR including: Arthralgia/myalgia, Headache/backache, chills/dizziness/malaise, moderate to high fever and n/v.      Marah Alcantar, PharmD, BCCCP

## 2019-08-05 NOTE — ASSESSMENT & PLAN NOTE
Patient with known gallstones last night started to have severe cramping abdominal pain in the right upper quadrant.  It was 10 out of 10 intensity she could not sleep from it all night long.  The patient does comes in his evaluation is found to have acute cholecystitis.  The patient will be going for surgical removal of the gallbladder shortly.  Postoperatively patient will be evaluated by continued pain optimization blood pressure optimization and monitoring of her hemoglobin hematocrit.  If things are stable overnight patient most likely can discharge tomorrow morning.

## 2019-08-05 NOTE — OR NURSING
The pt is awake and oriented. Respirations are regular and easy. Pain is controlled, the pt is comfortable. Incisions on abdomen dry and intact.

## 2019-08-05 NOTE — ANESTHESIA QCDR
2019 [facility] Clinical Data Registry (for Quality Improvement)     Postoperative nausea/vomiting risk protocol (Adult = 18 yrs and Pediatric 3-17 yrs)- (430 and 463)  General inhalation anesthetic (NOT TIVA) with PONV risk factors: Yes  Provision of anti-emetic therapy with at least 2 different classes of agents: Yes   Patient DID NOT receive anti-emetic therapy and reason is documented in Medical Record:  N/A    Multimodal Pain Management- (AQI59)  Patient undergoing Elective Surgery (i.e. Outpatient, or ASC, or Prescheduled Surgery prior to Hospital Admission): No  Use of Multimodal Pain Management, two or more drugs and/or interventions, NOT including systemic opioids: N/A  Exception: Documented allergy to multiple classes of analgesics: N/A    PACU assessment of acute postoperative pain prior to Anesthesia Care End- Applies to Patients Age = 18- (ABG7)  Initial PACU pain score is which of the following: < 7/10  Patient unable to report pain score: N/A    Post-anesthetic transfer of care checklist/protocol to PACU/ICU- (426 and 427)  Upon conclusion of case, patient transferred to which of the following locations: PACU/Non-ICU  Use of transfer checklist/protocol: Yes  Exclusion: Service Performed in Patient Hospital Room (and thus did not require transfer): N/A    PACU Reintubation- (AQI31)  General anesthesia requiring endotracheal intubation (ETT) along with subsequent extubation in OR or PACU: Yes  Required reintubation in the PACU: No   Extubation was a planned trial documented in the medical record prior to removal of the original airway device:  N/A    Unplanned admission to ICU related to anesthesia service up through end of PACU care- (MD51)  Unplanned admission to ICU (not initially anticipated at anesthesia start time): No

## 2019-08-05 NOTE — OP REPORT
Operative Report    Date: 8/5/2019    PreOp Diagnosis:   1.  Acute cholecystitis secondary to cholelithiasis  2.  Anemia     PostOp Diagnosis: Same     Procedure(s):  CHOLECYSTECTOMY, LAPAROSCOPIC - Wound Class: Clean Contaminated     Surgeon(s):  Nik Schumacher M.D.     Anesthesiologist/Type of Anesthesia:  Anesthesiologist: Jonatan Sanchez M.D./General     Surgical Staff:  Assistant: Ariana Dumont  Circulator: Anabell Neves R.N.  Scrub Person: Melonie Riley     Specimens removed if any:  ID Type Source Tests Collected by Time Destination   A :  Tissue Gallbladder PATHOLOGY SPECIMEN Nik Schumacher M.D. 8/5/2019 10:58 AM           Estimated Blood Loss: <10mL     Findings: Acutely inflamed, thickened gallbladder containing multiple stones     Complications: None noted    Indications:  48-year-old female presenting with acute cholecystitis secondary to cholelithiasis, and anemia of unknown etiology.  She was heme dynamically stable with normal liver function and bilirubin, a laparoscopic cholecystectomy was recommended.  The procedure was discussed in detail including the risk, benefits, and alternatives, and she wished to proceed.    Procedure in detail:   The patient was brought to the operating room and was placed in the supine position where general endotracheal anesthesia was induced. SCDs were in place and functioning. Preoperative antibiotics of ancef were given before incision time. The patient's abdomen was prepped and draped in the usual sterile fashion.    After infiltration of local anesthetic, a skin incision was made to accommodate a 5 mm port infra-umbilically.  A Veress needle was used to access the peritoneum, and after saline drop test, the abdomen was insufflated to 15 mmHg, which the patient tolerated well.  A 5mm 30 degree camera was then introduced and the abdomen inspected for evidence of trauma secondary to Veress needle or port placement, and found none.    Utilizing the  30-degree laparoscope with the patient in the reverse Trendelenburg position, and after infiltration of local anesthetic, an additional 11-mm port was inserted into the epigastrium just to the right of the midline. Then two 5-mm ports were inserted in the midclavicular and anterior axillary lines, in the right upper quadrant, all under direct visualization.    The gallbladder was identified, and there appeared to be acute on chronic inflammation associated with it. The gallbladder was retracted cephalad and caudally using gallbladder graspers. Using a combination of blunt and electrocautery dissection, the overlying peritoneum was peeled off the gallbladder infundibulum and proximal cystic duct, and circumferentially dissected.  Electrocautery was used to further remove the peritoneum off the gallbladder, to allow visualization of the bottom portion of the cystic plate. The cystic artery was then circumferentially dissected.  This allowed clear identification of Calot's triangle and the critical view of safety.      The cystic duct was the doubly clipped distally and divided. The cystic artery was then doubly clipped and divided.  Using electrocautery, the gallbladder was dissected from the liver bed.  After ensuring gallbladder bed hemostasis with cautery, the specimen was placed it in an endocatch bag, and removed via the epigastric port.    The right upper quadrant was irrigated copiously with normal saline solution. The cystic duct and artery stumps were inspected, and found them to be intact. The liver bed was hemostatic.    The trocars were removed under direct visualization.    The fascia on the all port sites >10 mm were closed with Vicryl sutures. The skin of all incisions was closed with running subcuticular suture and Dermabond.    All sponge, needle, and instrument counts were reported as correct at the end of the procedure. The patient tolerated the procedure well and left the operating room for the  recovery room in stable and satisfactory condition.      Nik Schumacher M.D.  [facility]  [phone]

## 2019-08-05 NOTE — PROGRESS NOTES
Surgery postop update note    [facility]  He will be admitting the patient for work-up of her anemia.  Given that she would otherwise be ready for discharge today, I will sign off.  I am happy to answer any further questions should this be required.  I will see the patient in 1 week in my office.  Below is the discharge instructions.      Laparoscopic Cholecystectomy D/C instructions:    1. DIET: Upon discharge from the hospital you may resume your normal preoperative diet. Depending on how you are feeling and whether you have nausea or not, you may wish to stay with a bland diet for the first few days. However, you can advance this as quickly as you feel ready.    2. ACTIVITIES: After discharge from the hospital, you may resume full routine activities. However, there should be no heavy lifting (greater than 15 pounds) and no strenuous activities until after your follow-up visit. Otherwise, routine activities of daily living are acceptable.    3. DRIVING: You may drive whenever you are off pain medications and are able to perform the activities needed to drive, i.e. turning, bending, twisting, etc.    4. BATHING: You may get the wound wet at any time after leaving the hospital. You may shower, but do not submerge in a bath for at least a week.     5. BOWEL FUNCTION: A few patients, after this operation, will develop either frequent or loose stools after meals. This usually corrects itself after a few days, to a few weeks. If this occurs, do not worry; it is not unusual and will resolve. Much more common than loose stools, is constipation. The combination of pain medication and decreased activity level can cause constipation in otherwise normal patients. If you feel this is occurring, take a laxative (Milk of Magnesia, Ex-Lax, Senokot, etc.) until the problem has resolved.    6. PAIN MEDICATION: You will be given a prescription for pain medication at  discharge. Please take these as directed. It is important to remember not to take medications on an empty stomach as this may cause nausea.    7.CALL IF YOU HAVE: (1) Fevers to more than 1010 F, (2) Unusual chest or leg pain, (3) Drainage or fluid from incision that may be foul smelling, increased tenderness or soreness at the wound or the wound edges are no longer together, redness or swelling at the incision site. Please do not hesitate to call with any other questions.     8. APPOINTMENT: Contact our office at [phone] for a follow-up appointment in 1 to 2 weeks following your procedure.    If you have any additional questions, please do not hesitate to call the office and speak to either myself or the physician on call.    Office address:  [address] [place] [address], [place] [address]    Nik Schumacher M.D.  [facility]  [phone]

## 2019-08-05 NOTE — ANESTHESIA TIME REPORT
Anesthesia Start and Stop Event Times     Date Time Event    8/5/2019 1013 Ready for Procedure     1021 Anesthesia Start     1117 Anesthesia Stop        Responsible Staff  08/05/19    Name Role Begin End    Jonatan Sanchez M.D. Anesth 1021 1117        Preop Diagnosis (Free Text):  Pre-op Diagnosis     CHOLELITHIASIS AND CHOLECYSTITIS        Preop Diagnosis (Codes):    Post op Diagnosis  Acute cholecystitis      Premium Reason  Non-Premium    Comments: emergency

## 2019-08-05 NOTE — DISCHARGE SUMMARY
Discharge Summary    CHIEF COMPLAINT ON ADMISSION  Chief Complaint   Patient presents with   • Abdominal Pain     Epigastric, RUQ pain since ~2100 last noc.  Hx cholelithiasis       Reason for Admission  Abdominal pain     Admission Date  8/5/2019    CODE STATUS  Full Code    HPI & HOSPITAL COURSE  This is a 48 y.o. female here with acute abdominal pain.  The patient was found to have acute cholecystitis.  The patient underwent a laparoscopic cholecystectomy.  Patient was also found to be extremely anemic.  Patient was evaluated found to be severely iron deficient.  At this point patient was given IV iron.  Patient will need outpatient follow-ups and will be discharged with pain management.       Therefore, she is discharged in good and stable condition to home with close outpatient follow-up.    The patient recovered much more quickly than anticipated on admission.    Discharge Date  8/5/2019    FOLLOW UP ITEMS POST DISCHARGE  Follow-up with the primary care physician in 7 to 10 days [place] follow-up with surgery as scheduled    DISCHARGE DIAGNOSES  Principal Problem:    Acute cholecystitis POA: Unknown  Active Problems:    Microcytic anemia POA: Unknown    Essential hypertension POA: Yes    Leukocytosis POA: Unknown    Thrombocytosis (HCC) POA: Unknown    Cervical radiculopathy POA: Yes    Hypercholesterolemia POA: Yes  Resolved Problems:    * No resolved hospital problems. *      FOLLOW UP  No future appointments.  Nik Schumacher M.D.  [address] [facility] [address]  [place] [id number] [phone]    Schedule an appointment as soon as possible for a visit in 1 week   For wound re-check    Kateryna Mcconnell M.D.  [address]  [place] [id number] [phone]      As needed      MEDICATIONS ON DISCHARGE     Medication List      START taking these medications      Instructions   oxyCODONE immediate-release 5 MG Tabs  Commonly known as:  ROXICODONE   Take 1 Tab by mouth every four hours as needed for up to 7  days.  Dose:  5 mg        CONTINUE taking these medications      Instructions   lisinopril 20 MG Tabs  Commonly known as:  PRINIVIL   Take 20 mg by mouth See Admin Instructions. Take 20 mg in the AM  May take 10 mg in the PM if needed  Dose:  20 mg     naproxen 500 MG Tabs  Commonly known as:  NAPROSYN   Take 500 mg by mouth 2 times a day as needed.  Dose:  500 mg     omeprazole 20 MG delayed-release capsule  Commonly known as:  PRILOSEC   Take 40 mg by mouth every day.  Dose:  40 mg            Allergies  No Known Allergies    DIET  Orders Placed This Encounter   Procedures   • Diet Order Low Fiber(GI Soft)     Standing Status:   Standing     Number of Occurrences:   1     Order Specific Question:   Diet:     Answer:   Low Fiber(GI Soft) [2]       ACTIVITY  As tolerated.  Weight bearing as tolerated    CONSULTATIONS  Surgery    PROCEDURES  Laparoscopic cholecystectomy    LABORATORY  Lab Results   Component Value Date    SODIUM 140 08/05/2019    POTASSIUM 3.9 08/05/2019    CHLORIDE 107 08/05/2019    CO2 24 08/05/2019    GLUCOSE 101 (H) 08/05/2019    BUN 9 08/05/2019    CREATININE 0.55 08/05/2019    CREATININE 0.7 08/28/2005        Lab Results   Component Value Date    WBC 11.0 (H) 08/05/2019    HEMOGLOBIN 7.9 (L) 08/05/2019    HEMATOCRIT 30.0 (L) 08/05/2019    PLATELETCT 712 (H) 08/05/2019        Total time of the discharge process exceeds 38 minutes.

## 2019-08-06 LAB
HGB A1 MFR BLD: 97.6 % (ref 95–97.9)
HGB A2 MFR BLD: 2.2 % (ref 2–3.5)
HGB C MFR BLD: 0 % (ref 0–0)
HGB E MFR BLD: 0 % (ref 0–0)
HGB F MFR BLD: 0.2 % (ref 0–2.1)
HGB FRACT BLD ELPH-IMP: NORMAL
HGB OTHER MFR BLD: 0 % (ref 0–0)
HGB S BLD QL SOLY: NORMAL
HGB S MFR BLD: 0 % (ref 0–0)
PATH INTERP BLD-IMP: NORMAL

## 2019-08-06 NOTE — PROGRESS NOTES
Patient with discharge orders, Discharge teaching and instructions given, pt and SO verbalized understanding. Copy provided to patient. All patient belongings sent with patient. Discharged home in stable condition, alert and awake. VS within normal limits. Escorted to lobby via wheelchair by [email or web address].

## 2019-08-06 NOTE — CARE PLAN
Pt educated regarding plan of care and medications. All questions answered.        Pt assessed for pain regularly and medicated PRN per MAR.

## 2019-08-06 NOTE — PROGRESS NOTES
Patient's wife at bedside. Patient is awake, discharge instructions discussed and reviewed with patient and wife, showed understanding.

## 2019-08-06 NOTE — PROGRESS NOTES
Report from Shannan KRUGER. Assumed patient care. Received patient sleeping calmly in bed, IVF Iron Dextran infusing well.

## 2019-08-06 NOTE — CARE PLAN
Fall precautions in place. Bed in lowest position. Non-skid socks in place. Personal possessions within reach. Mobility sign on door. Call light within reach. Pt educated regarding fall prevention and states understanding.

## 2019-08-06 NOTE — DISCHARGE INSTRUCTIONS
Discharge Instructions    Discharged to home by car with relative. Discharged via wheelchair, hospital escort: Yes.  Special equipment needed: Not Applicable    Be sure to schedule a follow-up appointment with your primary care doctor or any specialists as instructed.     Discharge Plan:   Diet Plan: Discussed  Activity Level: Discussed  Smoking Cessation Offered: Patient Refused  Confirmed Follow up Appointment: Patient to Call and Schedule Appointment  Confirmed Symptoms Management: Discussed  Medication Reconciliation Updated: Yes  Influenza Vaccine Indication: Patient Refuses    I understand that a diet low in cholesterol, fat, and sodium is recommended for good health. Unless I have been given specific instructions below for another diet, I accept this instruction as my diet prescription.   Other diet: GI soft     Special Instructions: None    · Is patient discharged on Warfarin / Coumadin?   No     Depression / Suicide Risk    As you are discharged from this [facility] facility, it is important to learn how to keep safe from harming yourself.    Recognize the warning signs:  · Abrupt changes in personality, positive or negative- including increase in energy   · Giving away possessions  · Change in eating patterns- significant weight changes-  positive or negative  · Change in sleeping patterns- unable to sleep or sleeping all the time   · Unwillingness or inability to communicate  · Depression  · Unusual sadness, discouragement and loneliness  · Talk of wanting to die  · Neglect of personal appearance   · Rebelliousness- reckless behavior  · Withdrawal from people/activities they love  · Confusion- inability to concentrate     If you or a loved one observes any of these behaviors or has concerns about self-harm, here's what you can do:  · Talk about it- your feelings and reasons for harming yourself  · Remove any means that you might use to hurt yourself (examples: pills, rope, extension cords,  firearm)  · Get professional help from the community (Mental Health, Substance Abuse, psychological counseling)  · Do not be alone:Call your Safe Contact- someone whom you trust who will be there for you.  · Call your local CRISIS HOTLINE 272-7390 or 354-500-4399  · Call your local Children's Mobile Crisis Response Team [place] (756) 071-4052 or www."[employer]  · Call the toll free National Suicide Prevention Hotlines   · National Suicide Prevention Lifeline 746-720-ZGLU (0525)  · [employer] 800-SUICIDE (774-1055)            Laparoscopic Cholecystectomy D/C instructions:     1. DIET: Upon discharge from the hospital you may resume your normal preoperative diet. Depending on how you are feeling and whether you have nausea or not, you may wish to stay with a bland diet for the first few days. However, you can advance this as quickly as you feel ready.     2. ACTIVITIES: After discharge from the hospital, you may resume full routine activities. However, there should be no heavy lifting (greater than 15 pounds) and no strenuous activities until after your follow-up visit. Otherwise, routine activities of daily living are acceptable.     3. DRIVING: You may drive whenever you are off pain medications and are able to perform the activities needed to drive, i.e. turning, bending, twisting, etc.     4. BATHING: You may get the wound wet at any time after leaving the hospital. You may shower, but do not submerge in a bath for at least a week.      5. BOWEL FUNCTION: A few patients, after this operation, will develop either frequent or loose stools after meals. This usually corrects itself after a few days, to a few weeks. If this occurs, do not worry; it is not unusual and will resolve. Much more common than loose stools, is constipation. The combination of pain medication and decreased activity level can cause constipation in otherwise normal patients. If you feel this is occurring, take a  laxative (Milk of Magnesia, Ex-Lax, Senokot, etc.) until the problem has resolved.     6. PAIN MEDICATION: You will be given a prescription for pain medication at discharge. Please take these as directed. It is important to remember not to take medications on an empty stomach as this may cause nausea.     7.CALL IF YOU HAVE: (1) Fevers to more than 1010 F, (2) Unusual chest or leg pain, (3) Drainage or fluid from incision that may be foul smelling, increased tenderness or soreness at the wound or the wound edges are no longer together, redness or swelling at the incision site. Please do not hesitate to call with any other questions.      8. APPOINTMENT: Contact our office at [phone] for a follow-up appointment in 1 to 2 weeks following your procedure.     If you have any additional questions, please do not hesitate to call the office and speak to either myself or the physician on call.     Office address:  [address] [place] [address], [place] [address]     Nik Schumacher M.D.  [facility]  [phone]                  Laparoscopic Cholecystectomy, Care After  These instructions give you information on caring for yourself after your procedure. Your doctor may also give you more specific instructions. Call your doctor if you have any problems or questions after your procedure.   HOME CARE  · Change your bandages (dressings) as told by your doctor.  · Keep the wound dry and clean. Wash the wound gently with soap and water. Pat the wound dry with a clean towel.  · Do not take baths, swim, or use hot tubs for 2 weeks, or as told by your doctor.  · Only take medicine as told by your doctor.  · Eat a normal diet as told by your doctor.  · Do not lift anything heavier than 10 pounds (4.5 kg) until your doctor says it is okay.  · Do not play contact sports for 1 week, or as told by your doctor.  GET HELP IF:  · Your wound is red, puffy (swollen), or painful.  · You have yellowish-white fluid (pus) coming from the  wound.  · You have fluid draining from the wound for more than 1 day.  · You have a bad smell coming from the wound.  · Your wound breaks open.  GET HELP RIGHT AWAY IF:   · You have a rash.  · You have trouble breathing.  · You have chest pain.  · You have a fever.  · You have pain in the shoulders (shoulder strap areas) that is getting worse.  · You feel dizzy or pass out (faint).  · You have severe belly (abdominal) pain.  · You feel sick to your stomach (nauseous) or throw up (vomit) for more than 1 day.     This information is not intended to replace advice given to you by your health care provider. Make sure you discuss any questions you have with your health care provider.     Document Released: 09/26/2009 Document Revised: 10/08/2014 Document Reviewed: 07/30/2014  [employer] Interactive Patient Education ©2016 [employer]      Discharge patient Home  Diet regular with 9-13 servings of fruits and vegetables  Activities as tolerated  Follow ups with PCP in 7-10 days, call for appointment  Meds per med rec sheet  No smoking, no alcohol, no caffeine  Wear seat belt in motorized vehicle  Take medications as perscribed  Keep appointments  If symptoms worsen call PCP, 911 or urgent care.

## 2019-08-07 NOTE — ANESTHESIA POSTPROCEDURE EVALUATION
Patient: Larisa Pinzon    Procedure Summary     Date:  08/05/19 Room / Location:  [facility] OR 08 / SURGERY [facility]    Anesthesia Start:  1021 Anesthesia Stop:  1117    Procedure:  CHOLECYSTECTOMY, LAPAROSCOPIC (N/A Abdomen) Diagnosis:  (CHOLELITHIASIS AND CHOLECYSTITIS)    Surgeon:  Nik Schumacher M.D. Responsible Provider:  Jonatan Sanchez M.D.    Anesthesia Type:  general ASA Status:  2 - Emergent          Final Anesthesia Type: general  Last vitals  BP   Blood Pressure: 113/60    Temp   36.7 °C (98 °F)    Pulse   Pulse: 60   Resp   18    SpO2   98 %      Anesthesia Post Evaluation    Patient location during evaluation: floor  Patient participation: complete - patient participated  Level of consciousness: awake and alert    Airway patency: patent  Anesthetic complications: no  Cardiovascular status: hemodynamically stable  Respiratory status: acceptable  Hydration status: euvolemic    PONV: none           Nurse Pain Score: 2 (NPRS)

## 2019-08-16 DIAGNOSIS — I10 ESSENTIAL HYPERTENSION: ICD-10-CM

## 2019-08-16 RX ORDER — LISINOPRIL 20 MG/1
TABLET ORAL
Qty: 90 TAB | Refills: 3 | Status: SHIPPED | OUTPATIENT
Start: 2019-08-16

## (undated) DEVICE — KIT ROOM DECONTAMINATION

## (undated) DEVICE — CANNULA W/SEAL 5X100 Z-THRE - ADED KII (12/BX)

## (undated) DEVICE — WATER IRRIG. STER. 1500 ML - (9/CA)

## (undated) DEVICE — KIT ANESTHESIA W/CIRCUIT & 3/LT BAG W/FILTER (20EA/CA)

## (undated) DEVICE — GLOVE BIOGEL PI ORTHO SZ 6 SURGICAL PF LF (40PR/BX)

## (undated) DEVICE — CLIP MED LG INTNL HRZN TI ESCP - (20/BX)

## (undated) DEVICE — HEAD HOLDER JUNIOR/ADULT

## (undated) DEVICE — TUBE E-T HI-LO CUFF 6.5MM (10EA/BX)

## (undated) DEVICE — NEEDLE INSFL 120MM 14GA VRRS - (20/BX)

## (undated) DEVICE — TROCAR 5X100 NON BLADED Z-TH - READ KII (6/BX)

## (undated) DEVICE — DERMABOND ADVANCED - (12EA/BX)

## (undated) DEVICE — SUTURE 0 COATED VICRYL 6-18IN - (12PK/BX)

## (undated) DEVICE — CANISTER SUCTION 3000ML MECHANICAL FILTER AUTO SHUTOFF MEDI-VAC NONSTERILE LF DISP  (40EA/CA)

## (undated) DEVICE — SODIUM CHL IRRIGATION 0.9% 1000ML (12EA/CA)

## (undated) DEVICE — SUTURE 4-0 VICRYL PLUS FS-2 - 27 INCH (36/BX)

## (undated) DEVICE — SENSOR SPO2 NEO LNCS ADHESIVE (20/BX) SEE USER NOTES

## (undated) DEVICE — MASK ANESTHESIA ADULT  - (100/CA)

## (undated) DEVICE — SUCTION INSTRUMENT YANKAUER BULBOUS TIP W/O VENT (50EA/CA)

## (undated) DEVICE — SET SUCTION/IRRIGATION WITH DISPOSABLE TIP (6/CA )PART #0250-070-520 IS A SUB

## (undated) DEVICE — DETERGENT RENUZYME PLUS 10 OZ PACKET (50/BX)

## (undated) DEVICE — BAG RETRIEVAL 10ML (10EA/BX)

## (undated) DEVICE — SUTURE GENERAL

## (undated) DEVICE — ELECTRODE 850 FOAM ADHESIVE - HYDROGEL RADIOTRNSPRNT (50/PK)

## (undated) DEVICE — SET LEADWIRE 5 LEAD BEDSIDE DISPOSABLE ECG (1SET OF 5/EA)

## (undated) DEVICE — GLOVE BIOGEL INDICATOR SZ 7.5 SURGICAL PF LTX - (50PR/BX 4BX/CA)

## (undated) DEVICE — TUBING CLEARLINK DUO-VENT - C-FLO (48EA/CA)

## (undated) DEVICE — GLOVE BIOGEL SZ 7.5 SURGICAL PF LTX - (50PR/BX 4BX/CA)

## (undated) DEVICE — PROTECTOR ULNA NERVE - (36PR/CA)

## (undated) DEVICE — TUBE CONNECT SUCTION CLEAR 120 X 1/4" (50EA/CA)"

## (undated) DEVICE — CHLORAPREP 26 ML APPLICATOR - ORANGE TINT(25/CA)

## (undated) DEVICE — ELECTRODE DUAL RETURN W/ CORD - (50/PK)

## (undated) DEVICE — NEPTUNE 4 PORT MANIFOLD - (20/PK)

## (undated) DEVICE — LACTATED RINGERS INJ 1000 ML - (14EA/CA 60CA/PF)

## (undated) DEVICE — TROCAR Z THREAD12MM OPTICAL - NON BLADED (6/BX)

## (undated) DEVICE — SET EXTENSION WITH 2 PORTS (48EA/CA) ***PART #2C8610 IS A SUBSTITUTE*****

## (undated) DEVICE — PACK LAP CHOLE OR - (2EA/CA)